# Patient Record
Sex: FEMALE | Race: BLACK OR AFRICAN AMERICAN | URBAN - METROPOLITAN AREA
[De-identification: names, ages, dates, MRNs, and addresses within clinical notes are randomized per-mention and may not be internally consistent; named-entity substitution may affect disease eponyms.]

---

## 2017-02-21 ENCOUNTER — TELEPHONE (OUTPATIENT)
Dept: INTERNAL MEDICINE CLINIC | Age: 82
End: 2017-02-21

## 2017-02-21 PROBLEM — Z63.8 STRESS DUE TO FAMILY TENSION: Status: ACTIVE | Noted: 2017-02-21

## 2017-02-21 PROBLEM — F32.A DEPRESSION: Status: ACTIVE | Noted: 2017-02-21

## 2017-02-21 PROBLEM — E03.9 ACQUIRED HYPOTHYROIDISM: Status: ACTIVE | Noted: 2017-02-21

## 2017-02-21 PROBLEM — G47.00 INSOMNIA: Status: ACTIVE | Noted: 2017-02-21

## 2017-02-21 PROBLEM — I10 ESSENTIAL HYPERTENSION: Status: ACTIVE | Noted: 2017-02-21

## 2017-02-21 PROBLEM — M19.90 ARTHRITIS: Status: ACTIVE | Noted: 2017-02-21

## 2017-02-24 ENCOUNTER — TELEPHONE (OUTPATIENT)
Dept: INTERNAL MEDICINE CLINIC | Age: 82
End: 2017-02-24

## 2017-02-24 NOTE — TELEPHONE ENCOUNTER
Pharmacist called related to a drug interaction to the medication Celebrex, Per Luma Griffiths she would like medication to be switched to Meloxicam 15mg, once daily

## 2017-03-01 ENCOUNTER — TELEPHONE (OUTPATIENT)
Dept: INTERNAL MEDICINE CLINIC | Age: 82
End: 2017-03-01

## 2017-03-01 NOTE — TELEPHONE ENCOUNTER
Second call was made to patient to inform of  recommendations.  Per  orders, patient is to stop taking Meloxicam, and start taking Tylenol arthritis OTC d/t side effetcts

## 2017-03-01 NOTE — TELEPHONE ENCOUNTER
Patient called and dicussed issues she was having r/t medication, pt stated her hands are swelling, and the medication is making her dizzy and she fell and hit her head on a sofa, patient was advised to visit ER to rule out any injures r/t her fall.  Patient was also told that I would talk to Stefany Harper regarding her medication, and give her a call back

## 2017-03-01 NOTE — TELEPHONE ENCOUNTER
Pt called stating she doesn't feel that she can take the Meloxicam. Pt states when she takes the med it makes her feel dizzy and she falls down. Pt is requesting a call back @ [phone].

## 2017-03-28 ENCOUNTER — OFFICE VISIT (OUTPATIENT)
Dept: INTERNAL MEDICINE CLINIC | Age: 82
End: 2017-03-28

## 2017-03-28 VITALS
TEMPERATURE: 98 F | BODY MASS INDEX: 25.69 KG/M2 | OXYGEN SATURATION: 96 % | HEIGHT: 63 IN | SYSTOLIC BLOOD PRESSURE: 174 MMHG | RESPIRATION RATE: 18 BRPM | DIASTOLIC BLOOD PRESSURE: 66 MMHG | HEART RATE: 55 BPM | WEIGHT: 145 LBS

## 2017-03-28 DIAGNOSIS — Z78.9 FULL CODE STATUS: ICD-10-CM

## 2017-03-28 DIAGNOSIS — Z13.39 SCREENING FOR ALCOHOLISM: ICD-10-CM

## 2017-03-28 DIAGNOSIS — M16.10 HIP ARTHRITIS: ICD-10-CM

## 2017-03-28 DIAGNOSIS — Z23 NEED FOR ZOSTAVAX ADMINISTRATION: ICD-10-CM

## 2017-03-28 DIAGNOSIS — Z71.89 ACP (ADVANCE CARE PLANNING): ICD-10-CM

## 2017-03-28 DIAGNOSIS — Z00.00 ROUTINE GENERAL MEDICAL EXAMINATION AT A HEALTH CARE FACILITY: Primary | ICD-10-CM

## 2017-03-28 DIAGNOSIS — Z23 NEED FOR STREPTOCOCCUS PNEUMONIAE VACCINATION: ICD-10-CM

## 2017-03-28 DIAGNOSIS — I10 ESSENTIAL HYPERTENSION: ICD-10-CM

## 2017-03-28 RX ORDER — ACETAMINOPHEN 500 MG
TABLET ORAL
COMMUNITY

## 2017-03-28 NOTE — PROGRESS NOTES
Mckenzie Deluna is a 80 y.o. female  Chief Complaint   Patient presents with    Arthritis:      follow-up    Sleep Problem     folloow-up pt states she has been sleeping better     1. Have you been to the ER, urgent care clinic since your last visit? Hospitalized since your last visit? Hospital r/t fall 3 weeks ago    2. Have you seen or consulted any other health care providers outside of the [address] [place] [address] since your last visit? Include any pap smears or colon screening. See above    Chief Complaint   Patient presents with    Arthritis     follow-up    Sleep Problem     folloow-up pt states she has been sleeping better     Pt reports prior hip injection with some relief from out of town orthopedist, none seen here. She is unable to take a bath because she cannot get out of the bath. This is a Subsequent Medicare Annual Wellness Visit providing Personalized Prevention Plan Services (PPPS) (Performed 12 months after initial AWV and PPPS )    I have reviewed the patient's medical history in detail and updated the computerized patient record. History     Past Medical History:   Diagnosis Date    Hypertension       Past Surgical History:   Procedure Laterality Date    HX HYSTERECTOMY       Current Outpatient Prescriptions   Medication Sig Dispense Refill    acetaminophen (TYLENOL EXTRA STRENGTH) 500 mg tablet Take  by mouth every six (6) hours as needed for Pain.  pneumococcal 13 sherry conj dip (PREVNAR-13) 0.5 mL syrg injection 0.5 mL by IntraMUSCular route once for 1 dose. 0.5 mL 0    raNITIdine (ZANTAC) 150 mg tablet Take 150 mg by mouth two (2) times a day.  Cholecalciferol, Vitamin D3, 50,000 unit cap Take 1 Cap by mouth daily.  DULoxetine (CYMBALTA) 20 mg capsule Take 20 mg by mouth two (2) times a day.  DULoxetine (CYMBALTA) 20 mg capsule Take 2 Caps by mouth nightly. 60 Cap 4    traZODone (DESYREL) 100 mg tablet Take 1 Tab by mouth nightly.  Indications: insomnia associated with depression 30 Tab 2    levothyroxine (SYNTHROID) 88 mcg tablet Take 1 Tab by mouth Daily (before breakfast). 90 Tab 4    hydroCHLOROthiazide (HYDRODIURIL) 25 mg tablet Take 1 Tab by mouth daily. 90 Tab 4    traMADol (ULTRAM) 50 mg tablet Take 50 mg by mouth every six (6) hours as needed for Pain.  celecoxib (CELEBREX) 200 mg capsule Take 1 Cap by mouth two (2) times a day for 90 days. 61 Cap 2     No Known Allergies  Family History   Problem Relation Age of Onset    Hypertension Mother     Hypertension Father      Social History   Substance Use Topics    Smoking status: Current Some Day Smoker     Packs/day: 0.25     Years: 3.00    Smokeless tobacco: Never Used    Alcohol use No     Patient Active Problem List   Diagnosis Code    Insomnia G47.00    Stress due to family tension Z63.8    Arthritis M19.90    Depression F32.9    Essential hypertension I10    Acquired hypothyroidism E03.9    ACP (advance care planning) Z71.89    Full code status Z78.9    Need for Zostavax administration Z23    Need for Streptococcus pneumoniae vaccination Z23       Depression Risk Factor Screening:     PHQ 2 / 9, over the last two weeks 2/21/2017   Little interest or pleasure in doing things Several days   Feeling down, depressed or hopeless Several days   Total Score PHQ 2 2     Alcohol Risk Factor Screening: On any occasion during the past 3 months, have you had more than 3 drinks containing alcohol? No    Do you average more than 7 drinks per week? No      Functional Ability and Level of Safety:     Hearing Loss   mild    Activities of Daily Living   Self-care. Requires assistance with: no ADLs    Fall Risk     Fall Risk Assessment, last 12 mths 3/28/2017   Able to walk? Yes   Fall in past 12 months? Yes   Fall with injury?  No   Number of falls in past 12 months 1   Fall Risk Score 1     Abuse Screen   Patient is not abused    Review of Systems     ROS: Negative except for BOLD  General: fever, chills, fatigue  Respiratory: cough, SOB, wheezing  Cardiovascular:  CP, palpitation, T[place], edema   Gastrointestinal: N/V/D, bleeding, constipation  Genito-Urinary: dysuria, hematuria  Musculoskeletal: muscle weakness, chronic pain, swelling      Physical Examination     Evaluation of Cognitive Function:  Mood/affect:  happy  Appearance: age appropriate  Family member/caregiver input: none present    Visit Vitals    /66 (BP 1 Location: Right arm, BP Patient Position: Sitting)    Pulse (!) 55    Temp 98 °F (36.7 °C) (Oral)    Resp 18    Ht 5' 3\" (1.6 m)    Wt 145 lb (65.8 kg)    SpO2 96%    BMI 25.69 kg/m2     General:  Alert, cooperative, no distress, appears stated age. Head:  Normocephalic, without obvious abnormality, atraumatic. Eyes:  Conjunctivae/corneas clear. PERRL, EOMs intact. Fundi benign. Ears:  Normal TMs and external ear canals both ears. Nose: Nares normal. Septum midline. Mucosa normal. No drainage or sinus tenderness. Throat: Lips, mucosa, and tongue normal. Teeth and gums normal.   Neck: Supple, symmetrical, trachea midline, no adenopathy, thyroid: no enlargement/tenderness/nodules, no carotid bruit and no JVD. Back:   Symmetric, no curvature. ROM normal. No CVA tenderness. Lungs:   Clear to auscultation bilaterally. Chest wall:  No tenderness or deformity. Heart:  Regular rate and rhythm, S1, S2 normal, no murmur, click, rub or gallop. Abdomen:   Soft, non-tender. Bowel sounds normal. No masses,  No organomegaly. Genitalia:  declined   Rectal:  declined   Extremities: Extremities normal, atraumatic, no cyanosis or edema, walks slowly with cane,    Pulses: 2+ and symmetric all extremities. Skin: Skin color, texture, turgor normal. No rashes or lesions. Lymph nodes: Cervical, supraclavicular, and axillary nodes normal.   Neurologic: CNII-XII intact. Normal strength, sensation and reflexes throughout.        Patient Care Team:  Melida Holbrook MD as PCP - General (Family Practice)    Advice/Referrals/Counseling   Education and counseling provided:  End-of-Life planning (with patient's consent)  Pneumococcal Vaccine  Influenza Vaccine  Hepatitis B Vaccine  Screening Mammography - pt declines further screening due to advanced age  Screening Pap and pelvic (covered once every 2 years)  Prostate cancer screening tests (PSA, covered annually)  Colorectal cancer screening tests- pt declines further screening due to advanced age  Cardiovascular screening blood test  Bone mass measurement (DEXA)- pt declines further screening due to advanced age  Screening for glaucoma  Diabetes screening test      Assessment/Plan       ICD-10-CM ICD-9-CM    1. Routine general medical examination at a health care facility Z00.00 V70.0 pneumococcal 13 sherry conj dip (PREVNAR-13) 0.5 mL syrg injection      REFERRAL TO OPHTHALMOLOGY      FULL CODE   2. Screening for alcoholism Z13.89 V79.1    3. Hip arthritis M19.90 716.95 Uncontrolled pain, eval by ortho needed,   Ref physical therapy for heated pool sheltering arms,   Ref   Sx improved after spiritual counseling. Regular stretching encouraged and demonstrated to patient  BP may be elevated secondary to pain- follow up in 1 month for BP recheck    4. ACP (advance care planning) Z71.89 V65.49    5. Full code status Z78.9 V49.89    6. Need for Streptococcus pneumoniae vaccination Z23 V03.82    7. Need for Zostavax administration Z23 V04.89    . Follow-up Disposition:  Return in about 6 months (around 9/28/2017) for Chronic Care - Nurse BP visit in 1 month.   Christiano Villagran MD

## 2017-03-28 NOTE — MR AVS SNAPSHOT
Visit Information Date & Time Provider Department Dept. Phone Encounter #  
 3/28/2017 10:30 AM Christiano Villagran MD [facility] Sports Medicine and [address] [place] [address] [phone] [id number] Follow-up Instructions Return in about 6 months (around 9/28/2017) for Chronic Care. Upcoming Health Maintenance Date Due ZOSTER VACCINE AGE 60> 7/11/1992 GLAUCOMA SCREENING Q2Y 7/11/1997 OSTEOPOROSIS SCREENING (DEXA) 7/11/1997 Pneumococcal 65+ Low/Medium Risk (1 of 2 - PCV13) 7/11/1997 MEDICARE YEARLY EXAM 7/11/1997 DTaP/Tdap/Td series (2 - Td) 3/28/2027 Allergies as of 3/28/2017  Review Complete On: 3/28/2017 By: Christiano Villagran MD  
 No Known Allergies Current Immunizations  Never Reviewed No immunizations on file. Not reviewed this visit You Were Diagnosed With   
  
 Codes Comments Hip arthritis    -  Primary ICD-10-CM: M19.90 ICD-9-CM: 716.95 Routine general medical examination at a health care facility     ICD-10-CM: Z00.00 ICD-9-CM: V70.0 Screening for alcoholism     ICD-10-CM: Z13.89 ICD-9-CM: V79.1 Vitals BP Pulse Temp Resp Height(growth percentile) Weight(growth percentile) 174/66 (BP 1 Location: Right arm, BP Patient Position: Sitting) (!) 55 98 °F (36.7 °C) (Oral) 18 5' 3\" (1.6 m) 145 lb (65.8 kg) SpO2 BMI OB Status Smoking Status 96% 25.69 kg/m2 Hysterectomy Current Some Day Smoker BMI and BSA Data Body Mass Index Body Surface Area  
 25.69 kg/m 2 1.71 m 2 Preferred Pharmacy Pharmacy Name Phone [facility] [phone] [facility] [phone] Your Updated Medication List  
  
   
This list is accurate as of: 3/28/17 10:56 AM.  Always use your most recent med list.  
  
  
  
  
 celecoxib 200 mg capsule Commonly known as:  CELEBREX Take 1 Cap by mouth two (2) times a day for 90 days. Cholecalciferol (Vitamin D3) 50,000 unit Cap Take 1 Cap by mouth daily. * DULoxetine 20 mg capsule Commonly known as:  CYMBALTA Take 20 mg by mouth two (2) times a day. * DULoxetine 20 mg capsule Commonly known as:  CYMBALTA Take 2 Caps by mouth nightly. hydroCHLOROthiazide 25 mg tablet Commonly known as:  HYDRODIURIL Take 1 Tab by mouth daily. levothyroxine 88 mcg tablet Commonly known as:  SYNTHROID Take 1 Tab by mouth Daily (before breakfast). raNITIdine 150 mg tablet Commonly known as:  ZANTAC Take 150 mg by mouth two (2) times a day. traMADol 50 mg tablet Commonly known as:  ULTRAM  
Take 50 mg by mouth every six (6) hours as needed for Pain. traZODone 100 mg tablet Commonly known as:  Bang Aleja Take 1 Tab by mouth nightly. Indications: insomnia associated with depression TYLENOL EXTRA STRENGTH 500 mg tablet Generic drug:  acetaminophen Take  by mouth every six (6) hours as needed for Pain. * Notice: This list has 2 medication(s) that are the same as other medications prescribed for you. Read the directions carefully, and ask your doctor or other care provider to review them with you. Follow-up Instructions Return in about 6 months (around 9/28/2017) for Chronic Care. Patient Instructions Hip Arthritis: Exercises Your Care Instructions Here are some examples of exercises for hip arthritis. Start each exercise slowly. Ease off the exercise if you start to have pain. Your doctor or physical therapist will tell you when you can start these exercises and which ones will work best for you. How to do the exercises Straight-leg raises to the outside 1. Lie on your side, with your affected hip on top. 2. Tighten the front thigh muscles of your top leg to keep your knee straight.  
3. Keep your hip and your leg straight in line with the rest of your body, and keep your knee pointing forward. Do not drop your hip back. 4. Lift your top leg straight up toward the ceiling, about 12 inches off the floor. Hold for about 6 seconds, then slowly lower your leg. 5. Repeat 8 to 12 times. 6. Switch legs and repeat steps 1 through 5, even if only one hip is sore. Straight-leg raises to the inside 1. Lie on your side with your affected hip on the floor. 2. You can either prop up your other leg on a chair, or you can bend that knee and put that foot in front of your other knee. Do not drop your hip back. 3. Tighten the muscles on the front thigh of your bottom leg to straighten that knee. 4. Keep your kneecap pointing forward and your leg straight, and lift your bottom leg up toward the ceiling about 6 inches. Hold for about 6 seconds, then lower slowly. 5. Repeat 8 to 12 times. 6. Switch legs and repeat steps 1 through 5, even if only one hip is sore. Hip hike 1. Stand sideways on the bottom step of a staircase, and hold on to the banister or wall. 2. Keeping both knees straight, lift your good leg off the step and let it hang down. Then hike your good hip up to the same level as your affected hip or a little higher. 3. Repeat 8 to 12 times. 4. Switch legs and repeat steps 1 through 3, even if only one hip is sore. Bridging 1. Lie on your back with both knees bent. Your knees should be bent about 90 degrees. 2. Then push your feet into the floor, squeeze your buttocks, and lift your hips off the floor until your shoulders, hips, and knees are all in a straight line. 3. Hold for about 6 seconds as you continue to breathe normally, and then slowly lower your hips back down to the floor and rest for up to 10 seconds. 4. Repeat 8 to 12 times. Hamstring stretch (lying down) 1. Lie flat on your back with your legs straight. If you feel discomfort in your back, place a small towel roll under your lower back. 2. Holding the back of your affected leg, lift your leg straight up and toward your body until you feel a stretch at the back of your thigh. 3. Hold the stretch for at least 30 seconds. 4. Repeat 2 to 4 times. 5. Switch legs and repeat steps 1 through 4, even if only one hip is sore. Standing quadriceps stretch 1. If you are not steady on your feet, hold on to a chair, counter, or wall. You can also lie on your stomach or your side to do this exercise. 2. Bend the knee of the leg you want to stretch, and reach behind you to grab the front of your foot or ankle with the hand on the same side. For example, if you are stretching your right leg, use your right hand. 3. Keeping your knees next to each other, pull your foot toward your buttock until you feel a gentle stretch across the front of your hip and down the front of your thigh. Your knee should be pointed directly to the ground, and not out to the side. 4. Hold the stretch for at least 15 to 30 seconds. 5. Repeat 2 to 4 times. 6. Switch legs and repeat steps 1 through 5, even if only one hip is sore. Hip rotator stretch 1. Lie on your back with both knees bent and your feet flat on the floor. 2. Put the ankle of your affected leg on your opposite thigh near your knee. 3. Use your hand to gently push your knee away from your body until you feel a gentle stretch around your hip. 4. Hold the stretch for 15 to 30 seconds. 5. Repeat 2 to 4 times. 6. Repeat steps 1 through 5, but this time use your hand to gently pull your knee toward your opposite shoulder. 7. Switch legs and repeat steps 1 through 6, even if only one hip is sore. Knee-to-chest 
 
1. Lie on your back with your knees bent and your feet flat on the floor. 2. Bring your affected leg to your chest, keeping the other foot flat on the floor (or keeping the other leg straight, whichever feels better on your lower back). 3. Keep your lower back pressed to the floor. Hold for at least 15 to 30 seconds. 4. Relax, and lower the knee to the starting position. 5. Repeat 2 to 4 times. 6. Switch legs and repeat steps 1 through 5, even if only one hip is sore. 7. To get more stretch, put your other leg flat on the floor while pulling your knee to your chest. 
Clamshell 1. Lie on your side, with your affected hip on top. Keep your feet and knees together and your knees bent. 2. Raise your top knee, but keep your feet together. Do not let your hips roll back. Your legs should open up like a clamshell. 3. Hold for 6 seconds. 4. Slowly lower your knee back down. Rest for 10 seconds. 5. Repeat 8 to 12 times. 6. Switch legs and repeat steps 1 through 5, even if only one hip is sore. Follow-up care is a key part of your treatment and safety. Be sure to make and go to all appointments, and call your doctor if you are having problems. It's also a good idea to know your test results and keep a list of the medicines you take. Where can you learn more? Go to http://coco-yulia.info/. Enter Y577 in the search box to learn more about \"Hip Arthritis: Exercises. \" Current as of: May 23, 2016 Content Version: 11.1 © 7523-6155 [employer]wise, Incorporated. Care instructions adapted under license by [employer] (which disclaims liability or warranty for this information). If you have questions about a medical condition or this instruction, always ask your healthcare professional. [place] [address] any warranty or liability for your use of this information. Hip Bursitis: Exercises Your Care Instructions Here are some examples of typical rehabilitation exercises for your condition. Start each exercise slowly. Ease off the exercise if you start to have pain. Your doctor or physical therapist will tell you when you can start these exercises and which ones will work best for you. How to do the exercises Hip rotator stretch 7. Lie on your back with both knees bent and your feet flat on the floor. 8. Put the ankle of your affected leg on your opposite thigh near your knee. 9. Use your hand to gently push your knee away from your body until you feel a gentle stretch around your hip. 10. Hold the stretch for 15 to 30 seconds. 11. Repeat 2 to 4 times. 12. Repeat steps 1 through 5, but this time use your hand to gently pull your knee toward your opposite shoulder. Iliotibial band stretch 7. Lean sideways against a wall. If you are not steady on your feet, hold on to a chair or counter. 8. Stand on the leg with the affected hip, with that leg close to the wall. Then cross your other leg in front of it. 9. Let your affected hip drop out to the side of your body and against wall. Then lean away from your affected hip until you feel a stretch. 10. Hold the stretch for 15 to 30 seconds. 11. Repeat 2 to 4 times. Straight-leg raises to the outside 5. Lie on your side, with your affected hip on top. 6. Tighten the front thigh muscles of your top leg to keep your knee straight. 7. Keep your hip and your leg straight in line with the rest of your body, and keep your knee pointing forward. Do not drop your hip back. 8. Lift your top leg straight up toward the ceiling, about 12 inches off the floor. Hold for about 6 seconds, then slowly lower your leg. 9. Repeat 8 to 12 times. Clamshell 5. Lie on your side, with your affected hip on top and your head propped on a pillow. Keep your feet and knees together and your knees bent. 6. Raise your top knee, but keep your feet together. Do not let your hips roll back. Your legs should open up like a clamshell. 7. Hold for 6 seconds. 8. Slowly lower your knee back down. Rest for 10 seconds. 9. Repeat 8 to 12 times. Follow-up care is a key part of your treatment and safety.  Be sure to make and go to all appointments, and call your doctor if you are having problems. It's also a good idea to know your test results and keep a list of the medicines you take. Where can you learn more? Go to http://coco-yulia.info/. Enter D677 in the search box to learn more about \"Hip Bursitis: Exercises. \" Current as of: May 23, 2016 Content Version: 11.1 © 9103-2669 [employer]. Care instructions adapted under license by [employer] (which disclaims liability or warranty for this information). If you have questions about a medical condition or this instruction, always ask your healthcare professional. [address] any warranty or liability for your use of this information. Stretching: Exercises Your Care Instructions Here are some examples of exercises for stretching. Start each exercise slowly. Ease off the exercise if you start to have pain. Your doctor or physical therapist will tell you when you can start these exercises and which ones will work best for you. How to do the exercises Latissimus stretch 13. Stand with your back straight and your feet shoulder-width apart. You can do this stretch sitting down if you are not steady on your feet. 14. Hold your arms above your head, and hold one hand with the other. 15. Pull upward while leaning straight over toward your right side. Keep your lower body straight. You should feel the stretch along your left side. 16. Hold 15 to 30 seconds, and then switch sides. 17. Repeat 2 to 4 times for each side. Triceps stretch 12. Stand with your back straight and your feet shoulder-width apart. You can do this stretch sitting down if you are not steady on your feet. 13. Bring your left elbow straight up while bending your arm. 15. Grab your left elbow with your right hand, and pull your left elbow toward your head with light pressure.  If you are more flexible, you may pull your arm slightly behind your head. You will feel the stretch along the back of your arm. 15. Hold 15 to 30 seconds, and then switch elbows. 16. Repeat 2 to 4 times for each arm. Calf stretch 10. Place your hands on a wall for balance. You can also do this with your hands on the back of a chair, a countertop, or a tree. 11. Step back with your left leg. Keep the leg straight, and press your left heel into the floor. 12. Press your hips forward, bending your right leg slightly. You will feel the stretch in your left calf. 13. Hold the stretch 15 to 30 seconds. 14. Repeat 2 to 4 times for each leg. Quadriceps stretch 10. Lie on your side with one hand supporting your head. [address] your upper leg back and grab your ankle with your other hand. 12. Stretch your leg back by pulling your foot toward your buttocks. You will feel the stretch in the front of your thigh. If this causes stress on your knees, do not do this stretch. 13. Hold the stretch 15 to 30 seconds. 14. Repeat 2 to 4 times for each leg. Groin stretch 6. Sit on the floor and put the soles of your feet together. Do not slump your back. 7. Grab your ankles and gently pull your legs toward you. 8. Press your knees toward the floor. You will feel the stretch in your inner thighs. 9. Hold 15 to 30 seconds. 10. Repeat 2 to 4 times. Hamstring stretch in doorway 7. Lie on the floor near a doorway, with your buttocks close to the wall. 8. Let the leg you are not stretching extend through the doorway. 9. Put the leg you want to stretch up on the wall, and straighten your knee to feel a gentle stretch at the back of your leg. 10. Hold the stretch for at least 15 to 30 seconds. Repeat 2 to 4 times. Follow-up care is a key part of your treatment and safety. Be sure to make and go to all appointments, and call your doctor if you are having problems.  It's also a good idea to know your test results and keep a list of the medicines you take. Where can you learn more? Go to http://coco-yulia.info/. Enter 780 0850 in the search box to learn more about \"Stretching: Exercises. \" Current as of: May 27, 2016 Content Version: 11.1 © 7664-1701 [employer], Incorporated. Care instructions adapted under license by "[employer] (which disclaims liability or warranty for this information). If you have questions about a medical condition or this instruction, always ask your healthcare professional. [address] any warranty or liability for your use of this information. Introducing hospitals & HEALTH SERVICES! [facility] introduces Taste Kitchen patient portal. Now you can access parts of your medical record, email your doctor's office, and request medication refills online. 1. In your internet browser, go to [email or web address] 2. Click on the First Time User? Click Here link in the Sign In box. You will see the New Member Sign Up page. 3. Enter your Taste Kitchen Access Code exactly as it appears below. You will not need to use this code after youve completed the sign-up process. If you do not sign up before the expiration date, you must request a new code. · T[employer] Kitchen Access Code: [email or web address] Expires: 5/22/2017  3:56 PM 
 
4. Enter the last four digits of your Social Security Number (xxxx) and Date of Birth (mm/dd/yyyy) as indicated and click Submit. You will be taken to the next sign-up page. 5. Create a Taste Kitchen ID. This will be your T[employer] login ID and cannot be changed, so think of one that is secure and easy to remember. 6. Create a T[employer] Kitchen password. You can change your password at any time. 7. Enter your Password Reset Question and Answer. This can be used at a later time if you forget your password. 8. Enter your e-mail address. You will receive e-mail notification when new information is available in [address] 9. Click Sign Up. You can now view and download portions of your medical record. 10. Click the Download Summary menu link to download a portable copy of your medical information. If you have questions, please visit the Frequently Asked Questions section of the [employer] website. Remember, Samanage is NOT to be used for urgent needs. For medical emergencies, dial 911. Now available from your iPhone and Android! Please provide this summary of care documentation to your next provider. Your primary care clinician is listed as Travis Berger. If you have any questions after today's visit, please call [phone].

## 2017-03-28 NOTE — PATIENT INSTRUCTIONS
Hip Arthritis: Exercises  Your Care Instructions  Here are some examples of exercises for hip arthritis. Start each exercise slowly. Ease off the exercise if you start to have pain. Your doctor or physical therapist will tell you when you can start these exercises and which ones will work best for you. How to do the exercises  Straight-leg raises to the outside    1. Lie on your side, with your affected hip on top. 2. Tighten the front thigh muscles of your top leg to keep your knee straight. 3. Keep your hip and your leg straight in line with the rest of your body, and keep your knee pointing forward. Do not drop your hip back. 4. Lift your top leg straight up toward the ceiling, about 12 inches off the floor. Hold for about 6 seconds, then slowly lower your leg. 5. Repeat 8 to 12 times. 6. Switch legs and repeat steps 1 through 5, even if only one hip is sore. Straight-leg raises to the inside    1. Lie on your side with your affected hip on the floor. 2. You can either prop up your other leg on a chair, or you can bend that knee and put that foot in front of your other knee. Do not drop your hip back. 3. Tighten the muscles on the front thigh of your bottom leg to straighten that knee. 4. Keep your kneecap pointing forward and your leg straight, and lift your bottom leg up toward the ceiling about 6 inches. Hold for about 6 seconds, then lower slowly. 5. Repeat 8 to 12 times. 6. Switch legs and repeat steps 1 through 5, even if only one hip is sore. Hip hike    1. Stand sideways on the bottom step of a staircase, and hold on to the banister or wall. 2. Keeping both knees straight, lift your good leg off the step and let it hang down. Then hike your good hip up to the same level as your affected hip or a little higher. 3. Repeat 8 to 12 times. 4. Switch legs and repeat steps 1 through 3, even if only one hip is sore. Bridging    1. Lie on your back with both knees bent.  Your knees should be bent about 90 degrees. 2. Then push your feet into the floor, squeeze your buttocks, and lift your hips off the floor until your shoulders, hips, and knees are all in a straight line. 3. Hold for about 6 seconds as you continue to breathe normally, and then slowly lower your hips back down to the floor and rest for up to 10 seconds. 4. Repeat 8 to 12 times. Hamstring stretch (lying down)    1. Lie flat on your back with your legs straight. If you feel discomfort in your back, place a small towel roll under your lower back. 2. Holding the back of your affected leg, lift your leg straight up and toward your body until you feel a stretch at the back of your thigh. 3. Hold the stretch for at least 30 seconds. 4. Repeat 2 to 4 times. 5. Switch legs and repeat steps 1 through 4, even if only one hip is sore. Standing quadriceps stretch    1. If you are not steady on your feet, hold on to a chair, counter, or wall. You can also lie on your stomach or your side to do this exercise. 2. Bend the knee of the leg you want to stretch, and reach behind you to grab the front of your foot or ankle with the hand on the same side. For example, if you are stretching your right leg, use your right hand. 3. Keeping your knees next to each other, pull your foot toward your buttock until you feel a gentle stretch across the front of your hip and down the front of your thigh. Your knee should be pointed directly to the ground, and not out to the side. 4. Hold the stretch for at least 15 to 30 seconds. 5. Repeat 2 to 4 times. 6. Switch legs and repeat steps 1 through 5, even if only one hip is sore. Hip rotator stretch    1. Lie on your back with both knees bent and your feet flat on the floor. 2. Put the ankle of your affected leg on your opposite thigh near your knee. 3. Use your hand to gently push your knee away from your body until you feel a gentle stretch around your hip.   4. Hold the stretch for 15 to 30 seconds. 5. Repeat 2 to 4 times. 6. Repeat steps 1 through 5, but this time use your hand to gently pull your knee toward your opposite shoulder. 7. Switch legs and repeat steps 1 through 6, even if only one hip is sore. Knee-to-chest    1. Lie on your back with your knees bent and your feet flat on the floor. 2. Bring your affected leg to your chest, keeping the other foot flat on the floor (or keeping the other leg straight, whichever feels better on your lower back). 3. Keep your lower back pressed to the floor. Hold for at least 15 to 30 seconds. 4. Relax, and lower the knee to the starting position. 5. Repeat 2 to 4 times. 6. Switch legs and repeat steps 1 through 5, even if only one hip is sore. 7. To get more stretch, put your other leg flat on the floor while pulling your knee to your chest.  Clamshell    1. Lie on your side, with your affected hip on top. Keep your feet and knees together and your knees bent. 2. Raise your top knee, but keep your feet together. Do not let your hips roll back. Your legs should open up like a clamshell. 3. Hold for 6 seconds. 4. Slowly lower your knee back down. Rest for 10 seconds. 5. Repeat 8 to 12 times. 6. Switch legs and repeat steps 1 through 5, even if only one hip is sore. Follow-up care is a key part of your treatment and safety. Be sure to make and go to all appointments, and call your doctor if you are having problems. It's also a good idea to know your test results and keep a list of the medicines you take. Where can you learn more? Go to http://coco-yulia.info/. Enter S036 in the search box to learn more about \"Hip Arthritis: Exercises. \"  Current as of: May 23, 2016  Content Version: 11.1  © 5444-3298 [employer]. Care instructions adapted under license by [employer] (which disclaims liability or warranty for this information).  If you have questions about a medical condition or this instruction, always ask your healthcare professional. [place] [address] any warranty or liability for your use of this information. Hip Bursitis: Exercises  Your Care Instructions  Here are some examples of typical rehabilitation exercises for your condition. Start each exercise slowly. Ease off the exercise if you start to have pain. Your doctor or physical therapist will tell you when you can start these exercises and which ones will work best for you. How to do the exercises  Hip rotator stretch    7. Lie on your back with both knees bent and your feet flat on the floor. 8. Put the ankle of your affected leg on your opposite thigh near your knee. 9. Use your hand to gently push your knee away from your body until you feel a gentle stretch around your hip. 10. Hold the stretch for 15 to 30 seconds. 11. Repeat 2 to 4 times. 12. Repeat steps 1 through 5, but this time use your hand to gently pull your knee toward your opposite shoulder. Iliotibial band stretch    7. Lean sideways against a wall. If you are not steady on your feet, hold on to a chair or counter. 8. Stand on the leg with the affected hip, with that leg close to the wall. Then cross your other leg in front of it. 9. Let your affected hip drop out to the side of your body and against wall. Then lean away from your affected hip until you feel a stretch. 10. Hold the stretch for 15 to 30 seconds. 11. Repeat 2 to 4 times. Straight-leg raises to the outside    5. Lie on your side, with your affected hip on top. 6. Tighten the front thigh muscles of your top leg to keep your knee straight. 7. Keep your hip and your leg straight in line with the rest of your body, and keep your knee pointing forward. Do not drop your hip back. 8. Lift your top leg straight up toward the ceiling, about 12 inches off the floor. Hold for about 6 seconds, then slowly lower your leg. 9. Repeat 8 to 12 times. Clamshell    5.  Lie on your side, with your affected hip on top and your head propped on a pillow. Keep your feet and knees together and your knees bent. 6. Raise your top knee, but keep your feet together. Do not let your hips roll back. Your legs should open up like a clamshell. 7. Hold for 6 seconds. 8. Slowly lower your knee back down. Rest for 10 seconds. 9. Repeat 8 to 12 times. Follow-up care is a key part of your treatment and safety. Be sure to make and go to all appointments, and call your doctor if you are having problems. It's also a good idea to know your test results and keep a list of the medicines you take. Where can you learn more? Go to http://coco-yulia.info/. Enter O287 in the search box to learn more about \"Hip Bursitis: Exercises. \"  Current as of: May 23, 2016  Content Version: 11.1  © 20068888-9552 [employer]. Care instructions adapted under license by [employer] (which disclaims liability or warranty for this information). If you have questions about a medical condition or this instruction, always ask your healthcare professional. [place] [address] any warranty or liability for your use of this information. Stretching: Exercises  Your Care Instructions  Here are some examples of exercises for stretching. Start each exercise slowly. Ease off the exercise if you start to have pain. Your doctor or physical therapist will tell you when you can start these exercises and which ones will work best for you. How to do the exercises  Latissimus stretch    13. Stand with your back straight and your feet shoulder-width apart. You can do this stretch sitting down if you are not steady on your feet. 14. Hold your arms above your head, and hold one hand with the other. 15. Pull upward while leaning straight over toward your right side. Keep your lower body straight. You should feel the stretch along your left side.   16. Hold 15 to 30 seconds, and then switch sides.  17. Repeat 2 to 4 times for each side. Triceps stretch    12. Stand with your back straight and your feet shoulder-width apart. You can do this stretch sitting down if you are not steady on your feet. 13. Bring your left elbow straight up while bending your arm. 15. Grab your left elbow with your right hand, and pull your left elbow toward your head with light pressure. If you are more flexible, you may pull your arm slightly behind your head. You will feel the stretch along the back of your arm. 15. Hold 15 to 30 seconds, and then switch elbows. 16. Repeat 2 to 4 times for each arm. Calf stretch    10. Place your hands on a wall for balance. You can also do this with your hands on the back of a chair, a countertop, or a tree. 11. Step back with your left leg. Keep the leg straight, and press your left heel into the floor. 12. Press your hips forward, bending your right leg slightly. You will feel the stretch in your left calf. 13. Hold the stretch 15 to 30 seconds. 14. Repeat 2 to 4 times for each leg. Quadriceps stretch    10. Lie on your side with one hand supporting your head. [address] your upper leg back and grab your ankle with your other hand. 12. Stretch your leg back by pulling your foot toward your buttocks. You will feel the stretch in the front of your thigh. If this causes stress on your knees, do not do this stretch. 13. Hold the stretch 15 to 30 seconds. 14. Repeat 2 to 4 times for each leg. Groin stretch    6. Sit on the floor and put the soles of your feet together. Do not slump your back. 7. Grab your ankles and gently pull your legs toward you. 8. Press your knees toward the floor. You will feel the stretch in your inner thighs. 9. Hold 15 to 30 seconds. 10. Repeat 2 to 4 times. Hamstring stretch in doorway    7. Lie on the floor near a doorway, with your buttocks close to the wall. 8. Let the leg you are not stretching extend through the doorway.   9. Put the leg you want to stretch up on the wall, and straighten your knee to feel a gentle stretch at the back of your leg. 10. Hold the stretch for at least 15 to 30 seconds. Repeat 2 to 4 times. Follow-up care is a key part of your treatment and safety. Be sure to make and go to all appointments, and call your doctor if you are having problems. It's also a good idea to know your test results and keep a list of the medicines you take. Where can you learn more? Go to http://coco-yulia.info/. Enter 780 [id number] in the search box to learn more about \"Stretching: Exercises. \"  Current as of: May 27, 2016  Content Version: 11.1  © 5889-1802 [employer]. Care instructions adapted under license by [employer] (which disclaims liability or warranty for this information). If you have questions about a medical condition or this instruction, always ask your healthcare professional. [address] any warranty or liability for your use of this information.

## 2017-12-07 ENCOUNTER — OFFICE VISIT (OUTPATIENT)
Dept: INTERNAL MEDICINE CLINIC | Age: 82
End: 2017-12-07

## 2017-12-07 VITALS
TEMPERATURE: 97.7 F | SYSTOLIC BLOOD PRESSURE: 140 MMHG | BODY MASS INDEX: 23.64 KG/M2 | RESPIRATION RATE: 16 BRPM | DIASTOLIC BLOOD PRESSURE: 81 MMHG | WEIGHT: 133.4 LBS | HEIGHT: 63 IN | OXYGEN SATURATION: 98 % | HEART RATE: 74 BPM

## 2017-12-07 DIAGNOSIS — R73.09 ELEVATED GLUCOSE: ICD-10-CM

## 2017-12-07 DIAGNOSIS — I10 ESSENTIAL HYPERTENSION: ICD-10-CM

## 2017-12-07 DIAGNOSIS — E03.9 ACQUIRED HYPOTHYROIDISM: ICD-10-CM

## 2017-12-07 DIAGNOSIS — F32.A DEPRESSION, UNSPECIFIED DEPRESSION TYPE: ICD-10-CM

## 2017-12-07 DIAGNOSIS — Z13.820 SCREENING FOR OSTEOPOROSIS: ICD-10-CM

## 2017-12-07 DIAGNOSIS — G47.00 INSOMNIA, UNSPECIFIED TYPE: Primary | ICD-10-CM

## 2017-12-07 RX ORDER — HYDROCHLOROTHIAZIDE 25 MG/1
25 TABLET ORAL DAILY
Qty: 90 TAB | Refills: 4 | Status: SHIPPED | OUTPATIENT
Start: 2017-12-07 | End: 2019-03-29 | Stop reason: SDUPTHER

## 2017-12-07 RX ORDER — DULOXETIN HYDROCHLORIDE 20 MG/1
40 CAPSULE, DELAYED RELEASE ORAL
Qty: 60 CAP | Refills: 4 | Status: SHIPPED | OUTPATIENT
Start: 2017-12-07 | End: 2019-03-29 | Stop reason: SDUPTHER

## 2017-12-07 NOTE — MR AVS SNAPSHOT
Visit Information Date & Time Provider Department Dept. Phone Encounter #  
 12/7/2017 11:00 AM Ebonie Madison MD [facility] and [address] [place] [address] [phone] [id number] Follow-up Instructions Return in about 4 months (around 4/3/2018) for medicare wellness. Follow-up and Disposition History Upcoming Health Maintenance Date Due  
 GLAUCOMA SCREENING Q2Y 7/11/1997 OSTEOPOROSIS SCREENING (DEXA) 7/11/1997 MEDICARE YEARLY EXAM 3/29/2018 Pneumococcal 65+ Low/Medium Risk (2 of 2 - PPSV23) 12/7/2018 DTaP/Tdap/Td series (2 - Td) 3/28/2027 Allergies as of 12/7/2017  Review Complete On: 12/7/2017 By: Andrew Ann No Known Allergies Current Immunizations  Never Reviewed No immunizations on file. Not reviewed this visit You Were Diagnosed With   
  
 Codes Comments Insomnia, unspecified type    -  Primary ICD-10-CM: G47.00 ICD-9-CM: 780.52 Depression, unspecified depression type     ICD-10-CM: F32.9 ICD-9-CM: 646 Essential hypertension     ICD-10-CM: I10 
ICD-9-CM: 401.9 Acquired hypothyroidism     ICD-10-CM: E03.9 ICD-9-CM: 244.9 Elevated glucose     ICD-10-CM: R73.09 
ICD-9-CM: 790.29 Screening for osteoporosis     ICD-10-CM: Z13.820 ICD-9-CM: V82.81 Vitals BP Pulse Temp Resp Height(growth percentile) Weight(growth percentile) 140/81 (BP 1 Location: Left arm, BP Patient Position: Sitting) 74 97.7 °F (36.5 °C) (Oral) 16 5' 3\" (1.6 m) 133 lb 6.4 oz (60.5 kg) SpO2 BMI OB Status Smoking Status 98% 23.63 kg/m2 Hysterectomy Current Some Day Smoker Vitals History BMI and BSA Data Body Mass Index Body Surface Area  
 23.63 kg/m 2 1.64 m 2 Preferred Pharmacy Pharmacy Name Phone [facility] [address] [facility] [phone] Your Updated Medication List  
  
   
 This list is accurate as of: 12/7/17 11:58 AM.  Always use your most recent med list.  
  
  
  
  
 Cholecalciferol (Vitamin D3) 50,000 unit Cap Take 1 Cap by mouth daily. DULoxetine 20 mg capsule Commonly known as:  CYMBALTA Take 2 Caps by mouth nightly. hydroCHLOROthiazide 25 mg tablet Commonly known as:  HYDRODIURIL Take 1 Tab by mouth daily. levothyroxine 88 mcg tablet Commonly known as:  SYNTHROID Take 1 Tab by mouth Daily (before breakfast). raNITIdine 150 mg tablet Commonly known as:  ZANTAC Take 150 mg by mouth two (2) times a day. traMADol 50 mg tablet Commonly known as:  ULTRAM  
Take 50 mg by mouth every six (6) hours as needed for Pain. traZODone 100 mg tablet Commonly known as:  DESYREL  
TAKE ONE TABLET BY MOUTH ONCE NIGHTLY  
  
 TYLENOL EXTRA STRENGTH 500 mg tablet Generic drug:  acetaminophen Take  by mouth every six (6) hours as needed for Pain. Prescriptions Sent to Pharmacy Refills  
 hydroCHLOROthiazide (HYDRODIURIL) 25 mg tablet 4 Sig: Take 1 Tab by mouth daily. Class: Normal  
 Pharmacy: [address] Ph #: [phone] Route: Oral  
 DULoxetine (CYMBALTA) 20 mg capsule 4 Sig: Take 2 Caps by mouth nightly. Class: Normal  
 Pharmacy: [address] Ph #: [phone] Route: Oral  
  
We Performed the Following HEMOGLOBIN A1C WITH EAG [82058 CPT(R)] METABOLIC PANEL, BASIC [05031 CPT(R)] REFERRAL TO OPHTHALMOLOGY [REF57 Custom] TSH REFLEX TO T4 [[id number] Custom] Comments:  
 Please add T3 if TSH is abnormal  
  
Follow-up Instructions Return in about 4 months (around 4/3/2018) for medicare wellness. To-Do List   
 12/07/2017 Imaging:  DEXA BONE DENSITY STUDY AXIAL Referral Information Referral ID Referred By Referred To  
  
 [id number] [place] [facility] [address] [place], [address] Visits Status Start Date End Date 1 New Request 12/7/17 12/7/18 If your referral has a status of pending review or denied, additional information will be sent to support the outcome of this decision. Introducing [facility] SERVICES! Michael Mejia introduces Peak 10 patient portal. Now you can access parts of your medical record, email your doctor's office, and request medication refills online. 1. In your internet browser, go to https://[employer]. [employer] 2. Click on the First Time User? Click Here link in the Sign In box. You will see the New Member Sign Up page. 3. Enter your Peak 10 Access Code exactly as it appears below. You will not need to use this code after youve completed the sign-up process. If you do not sign up before the expiration date, you must request a new code. · Peak 10 Access Code: [email or web address] Expires: 3/7/2018 11:20 AM 
 
4. Enter the last four digits of your Social Security Number (xxxx) and Date of Birth (mm/dd/yyyy) as indicated and click Submit. You will be taken to the next sign-up page. 5. Create a Peak 10 ID. This will be your Peak 10 login ID and cannot be changed, so think of one that is secure and easy to remember. 6. Create a Peak 10 password. You can change your password at any time. 7. Enter your Password Reset Question and Answer. This can be used at a later time if you forget your password. 8. Enter your e-mail address. You will receive e-mail notification when new information is available in [address] 9. Click Sign Up. You can now view and download portions of your medical record. 10. Click the Download Summary menu link to download a portable copy of your medical information.  
 
If you have questions, please visit the Frequently Asked Questions section of the C2C Link. Remember, [employer] is NOT to be used for urgent needs. For medical emergencies, dial 911. Now available from your iPhone and Android! Please provide this summary of care documentation to your next provider. Your primary care clinician is listed as Martín Hale. If you have any questions after today's visit, please call [phone].

## 2017-12-07 NOTE — PROGRESS NOTES
Chief Complaint   Patient presents with    Medication Refill     rm 4 patient here to follow up on medications with Dr. Mahesh Hull Insomnia     patient states it is doing better      1. Have you been to the ER, urgent care clinic since your last visit? Hospitalized since your last visit? No    2. Have you seen or consulted any other health care providers outside of the [address] [place] [address] since your last visit? Include any pap smears or colon screening.  No

## 2017-12-07 NOTE — PROGRESS NOTES
Ms. Arliss Alpers is a 80y.o. year old female who had concerns including Medication Refill and Insomnia. HPI:  Chief Complaint   Patient presents with    Medication Refill     rm 4 patient here to follow up on medications with Dr. Khoi Hernandez Insomnia     patient states it is doing better      Hx of depression. Controlled on cymbalta. Hip OA improved with home exercises and tylenol extra strength. HTN and hypothyroidism- well controlled. Tolerates medication. Screening test due still. Past Medical History:   Diagnosis Date    Hypertension      Current Outpatient Prescriptions   Medication Sig Dispense    hydroCHLOROthiazide (HYDRODIURIL) 25 mg tablet Take 1 Tab by mouth daily. 90 Tab    DULoxetine (CYMBALTA) 20 mg capsule Take 2 Caps by mouth nightly. 60 Cap    traZODone (DESYREL) 100 mg tablet TAKE ONE TABLET BY MOUTH ONCE NIGHTLY 30 Tab    acetaminophen (TYLENOL EXTRA STRENGTH) 500 mg tablet Take  by mouth every six (6) hours as needed for Pain.  traMADol (ULTRAM) 50 mg tablet Take 50 mg by mouth every six (6) hours as needed for Pain.  raNITIdine (ZANTAC) 150 mg tablet Take 150 mg by mouth two (2) times a day.  Cholecalciferol, Vitamin D3, 50,000 unit cap Take 1 Cap by mouth daily.  levothyroxine (SYNTHROID) 88 mcg tablet Take 1 Tab by mouth Daily (before breakfast). 90 Tab     No current facility-administered medications for this visit. Reviewed PmHx, RxHx, FmHx, SocHx, AllgHx and updated and dated in the chart.     ROS: Negative except for BOLD  General: fever, chills, fatigue  Respiratory: cough, SOB, wheezing  Cardiovascular:  CP, palpitation, TOLEDO, edema   Gastrointestinal: N/V/D, bleeding  Genito-Urinary: dysuria, hematuria  Musculoskeletal: muscle weakness, pain, swelling    OBJECTIVE:   Visit Vitals    /81 (BP 1 Location: Left arm, BP Patient Position: Sitting)    Pulse 74    Temp 97.7 °F (36.5 °C) (Oral)    Resp 16    Ht 5' 3\" (1.6 m)    Wt 133 lb 6.4 oz (60.5 kg)    SpO2 98%    BMI 23.63 kg/m2     GEN: The patient appears well, alert, oriented x 3, in no distress. ENT: bilateral TM and canal normal.  Neck supple. No adenopathy or thyromegaly. KIRA. Lungs: clear bilaterally, good air entry, no wheezes, rhonchi or rales. Cardiovascular: regular rate and rhythm. S1 and S2 normal, no murmurs,  Abdomen: + BS, soft without tenderness, guarding, rebound, mass or organomegaly. Extremities: no edema, normal peripheral pulses. Neurological: normal, gross sensory and motor in tact without focal findings. Results for orders placed or performed in visit on 82/46/46   METABOLIC PANEL, BASIC   Result Value Ref Range    Glucose 160 (H) 65 - 99 mg/dL    BUN 17 8 - 27 mg/dL    Creatinine 0.84 0.57 - 1.00 mg/dL    GFR est non-AA 64 >59 mL/min/1.73    GFR est AA 74 >59 mL/min/1.73    BUN/Creatinine ratio 20 11 - 26    Sodium 142 134 - 144 mmol/L    Potassium 4.5 3.5 - 5.2 mmol/L    Chloride 99 96 - 106 mmol/L    CO2 21 18 - 29 mmol/L    Calcium 11.0 (H) 8.7 - 10.3 mg/dL   TSH REFLEX TO T4   Result Value Ref Range    TSH 1.010 0.450 - 4.500 uIU/mL         Assessment/ Plan:       ICD-10-CM ICD-9-CM    1. Insomnia, unspecified type G47.00 780.52 DULoxetine (CYMBALTA) 20 mg capsule   2. Depression, unspecified depression type F32.9 311 DULoxetine (CYMBALTA) 20 mg capsule      TSH REFLEX TO T4   3. Essential hypertension I10 401.9 hydroCHLOROthiazide (HYDRODIURIL) 25 mg tablet   4. Acquired hypothyroidism E03.9 244.9 TSH REFLEX TO T4   5. Elevated glucose R73.09 790.29 REFERRAL TO OPHTHALMOLOGY      HEMOGLOBIN A1C WITH EAG   6. Screening for osteoporosis Z13.820 V82.81 DEXA BONE DENSITY STUDY AXIAL      METABOLIC PANEL, BASIC       Elevated sugars previously, screen diabetes with a1c. I have discussed the diagnosis with the patient and the intended plan as seen in the above orders.   The patient has received an after-visit summary and questions were answered concerning future plans. Medication Side Effects and Warnings were discussed with patient. Follow-up Disposition:  Return in about 4 months (around 4/3/2018) for medicare wellness.       Melida Holbrook MD

## 2017-12-08 ENCOUNTER — TELEPHONE (OUTPATIENT)
Dept: INTERNAL MEDICINE CLINIC | Age: 82
End: 2017-12-08

## 2017-12-08 LAB
BUN SERPL-MCNC: 9 MG/DL (ref 8–27)
BUN/CREAT SERPL: 11 (ref 12–28)
CALCIUM SERPL-MCNC: 10.1 MG/DL (ref 8.7–10.3)
CHLORIDE SERPL-SCNC: 98 MMOL/L (ref 96–106)
CO2 SERPL-SCNC: 25 MMOL/L (ref 18–29)
CREAT SERPL-MCNC: 0.81 MG/DL (ref 0.57–1)
EST. AVERAGE GLUCOSE BLD GHB EST-MCNC: 120 MG/DL
GFR SERPLBLD CREATININE-BSD FMLA CKD-EPI: 66 ML/MIN/1.73
GFR SERPLBLD CREATININE-BSD FMLA CKD-EPI: 77 ML/MIN/1.73
GLUCOSE SERPL-MCNC: 143 MG/DL (ref 65–99)
HBA1C MFR BLD: 5.8 % (ref 4.8–5.6)
POTASSIUM SERPL-SCNC: 3 MMOL/L (ref 3.5–5.2)
SODIUM SERPL-SCNC: 143 MMOL/L (ref 134–144)
TSH SERPL DL<=0.005 MIU/L-ACNC: 1.78 UIU/ML (ref 0.45–4.5)

## 2017-12-14 DIAGNOSIS — G47.00 INSOMNIA, UNSPECIFIED TYPE: ICD-10-CM

## 2017-12-18 RX ORDER — TRAZODONE HYDROCHLORIDE 100 MG/1
TABLET ORAL
Qty: 30 TAB | Refills: 5 | Status: SHIPPED | OUTPATIENT
Start: 2017-12-18 | End: 2019-03-29 | Stop reason: DRUGHIGH

## 2018-05-25 RX ORDER — LEVOTHYROXINE SODIUM 88 UG/1
88 TABLET ORAL
Qty: 30 TAB | Refills: 0 | Status: SHIPPED | OUTPATIENT
Start: 2018-05-25 | End: 2018-06-30 | Stop reason: SDUPTHER

## 2018-07-02 RX ORDER — LEVOTHYROXINE SODIUM 88 UG/1
TABLET ORAL
Qty: 30 TAB | Refills: 0 | Status: SHIPPED | OUTPATIENT
Start: 2018-07-02 | End: 2018-07-29 | Stop reason: SDUPTHER

## 2018-07-31 RX ORDER — LEVOTHYROXINE SODIUM 88 UG/1
TABLET ORAL
Qty: 30 TAB | Refills: 0 | Status: SHIPPED | OUTPATIENT
Start: 2018-07-31 | End: 2019-03-29 | Stop reason: SDUPTHER

## 2019-03-29 ENCOUNTER — OFFICE VISIT (OUTPATIENT)
Dept: INTERNAL MEDICINE CLINIC | Age: 84
End: 2019-03-29

## 2019-03-29 VITALS
RESPIRATION RATE: 16 BRPM | TEMPERATURE: 96.3 F | HEART RATE: 52 BPM | HEIGHT: 63 IN | BODY MASS INDEX: 27.54 KG/M2 | WEIGHT: 155.4 LBS | OXYGEN SATURATION: 98 % | DIASTOLIC BLOOD PRESSURE: 75 MMHG | SYSTOLIC BLOOD PRESSURE: 176 MMHG

## 2019-03-29 DIAGNOSIS — I10 ESSENTIAL HYPERTENSION: ICD-10-CM

## 2019-03-29 DIAGNOSIS — K21.9 GASTROESOPHAGEAL REFLUX DISEASE WITHOUT ESOPHAGITIS: ICD-10-CM

## 2019-03-29 DIAGNOSIS — I10 ESSENTIAL HYPERTENSION, BENIGN: Primary | ICD-10-CM

## 2019-03-29 DIAGNOSIS — E03.9 HYPOTHYROIDISM, UNSPECIFIED TYPE: ICD-10-CM

## 2019-03-29 DIAGNOSIS — F32.A DEPRESSION, UNSPECIFIED DEPRESSION TYPE: ICD-10-CM

## 2019-03-29 DIAGNOSIS — E03.8 TSH (THYROID-STIMULATING HORMONE DEFICIENCY): ICD-10-CM

## 2019-03-29 DIAGNOSIS — Z79.899 ENCOUNTER FOR LONG-TERM (CURRENT) DRUG USE: ICD-10-CM

## 2019-03-29 DIAGNOSIS — R73.09 ELEVATED GLUCOSE: ICD-10-CM

## 2019-03-29 DIAGNOSIS — M47.816 OSTEOARTHRITIS OF LUMBAR SPINE, UNSPECIFIED SPINAL OSTEOARTHRITIS COMPLICATION STATUS: ICD-10-CM

## 2019-03-29 DIAGNOSIS — Z00.00 MEDICARE ANNUAL WELLNESS VISIT, SUBSEQUENT: ICD-10-CM

## 2019-03-29 DIAGNOSIS — Z13.39 SCREENING FOR ALCOHOLISM: ICD-10-CM

## 2019-03-29 DIAGNOSIS — Z83.3 FAMILY HISTORY OF DIABETES MELLITUS: ICD-10-CM

## 2019-03-29 DIAGNOSIS — G47.00 INSOMNIA, UNSPECIFIED TYPE: ICD-10-CM

## 2019-03-29 DIAGNOSIS — G47.01 INSOMNIA DUE TO MEDICAL CONDITION: ICD-10-CM

## 2019-03-29 RX ORDER — DULOXETIN HYDROCHLORIDE 20 MG/1
40 CAPSULE, DELAYED RELEASE ORAL
Qty: 180 CAP | Refills: 0 | Status: SHIPPED | OUTPATIENT
Start: 2019-03-29 | End: 2019-09-10

## 2019-03-29 RX ORDER — RANITIDINE 150 MG/1
150 TABLET, FILM COATED ORAL 2 TIMES DAILY
Qty: 180 TAB | Refills: 0 | Status: SHIPPED | OUTPATIENT
Start: 2019-03-29 | End: 2019-07-05 | Stop reason: SDUPTHER

## 2019-03-29 RX ORDER — TRAZODONE HYDROCHLORIDE 50 MG/1
50 TABLET ORAL
Qty: 90 TAB | Refills: 0 | Status: SHIPPED | OUTPATIENT
Start: 2019-03-29

## 2019-03-29 RX ORDER — HYDROCHLOROTHIAZIDE 25 MG/1
25 TABLET ORAL DAILY
Qty: 90 TAB | Refills: 0 | Status: SHIPPED | OUTPATIENT
Start: 2019-03-29 | End: 2019-07-05 | Stop reason: SDUPTHER

## 2019-03-29 RX ORDER — LEVOTHYROXINE SODIUM 88 UG/1
88 TABLET ORAL
Qty: 90 TAB | Refills: 0 | Status: SHIPPED | OUTPATIENT
Start: 2019-03-29 | End: 2019-07-05 | Stop reason: SDUPTHER

## 2019-03-29 NOTE — PROGRESS NOTES
Chief Complaint Patient presents with  Establish Care  
  needs med refils [facility] Annual Wellness Visit 1. Have you been to the ER, urgent care clinic since your last visit? Hospitalized since your last visit? no 
 
2. Have you seen or consulted any other health care providers outside of the [address] [place] [address] since your last visit? Include any pap smears or colon screening. No 
 
Patient has not had any meds in awhile per her. This is the Subsequent Medicare Annual Wellness Exam, performed 12 months or more after the Initial AWV or the last Subsequent AWV I have reviewed the patient's medical history in detail and updated the computerized patient record. History Past Medical History:  
Diagnosis Date  Hypertension Past Surgical History:  
Procedure Laterality Date  HX HYSTERECTOMY Current Outpatient Medications Medication Sig Dispense Refill  levothyroxine (SYNTHROID) 88 mcg tablet Take 1 Tab by mouth Daily (before breakfast). 90 Tab 0  
 traZODone (DESYREL) 50 mg tablet Take 1 Tab by mouth nightly. Indications: use for sleep 90 Tab 0  
 hydroCHLOROthiazide (HYDRODIURIL) 25 mg tablet Take 1 Tab by mouth daily. Indications: take 1 daily for blood pressure 90 Tab 0  
 DULoxetine (CYMBALTA) 20 mg capsule Take 2 Caps by mouth nightly. For depression and chronic pain 180 Cap 0  
 raNITIdine (ZANTAC) 150 mg tablet Take 1 Tab by mouth two (2) times a day. For heartburn 180 Tab 0  
 acetaminophen (TYLENOL EXTRA STRENGTH) 500 mg tablet Take  by mouth every six (6) hours as needed for Pain.  traMADol (ULTRAM) 50 mg tablet Take 50 mg by mouth every six (6) hours as needed for Pain.  Cholecalciferol, Vitamin D3, 50,000 unit cap Take 1 Cap by mouth daily. No Known Allergies Family History Problem Relation Age of Onset  Hypertension Mother  Hypertension Father Social History Tobacco Use  Smoking status: Former Smoker Packs/day: 0.25 Years: 3.00 Pack years: 0.75  Smokeless tobacco: Never Used  Tobacco comment: quit 1.5 years ago Substance Use Topics  Alcohol use: No  
 
Patient Active Problem List  
Diagnosis Code  Insomnia G47.00  Stress due to family tension Z63.8  Arthritis M19.90  Depression F32.9  
 Essential hypertension I10  
 Acquired hypothyroidism E03.9  ACP (advance care planning) Z71.89  
 Full code status Z78.9  Need for Zostavax administration Z23  Need for Streptococcus pneumoniae vaccination Z23 Depression Risk Factor Screening:  
 
3 most recent PHQ Screens 2/21/2017 Little interest or pleasure in doing things Several days Feeling down, depressed, irritable, or hopeless Several days Total Score PHQ 2 2 Alcohol Risk Factor Screening:  
 
 
Functional Ability and Level of Safety:  
Hearing Loss: \"Hearing is good. \"} Activities of Daily Living Fall Risk Fall Risk Assessment, last 12 mths 3/29/2019 Able to walk? Yes Fall in past 12 months? No  
Fall with injury? -  
Number of falls in past 12 months - Fall Risk Score - Cognitive Screening Evaluation of Cognitive Function: 
Has your family/caregiver stated any concerns about your memory: 
 
 
Patient Care Team  
Patient Care Team: 
Manjula Parks MD as PCP - [facility]) Assessment/Plan Education and counseling provided: 
 
 
Diagnoses and all orders for this visit: 1. Essential hypertension, benign -     LIPID PANEL 
-     URINALYSIS W/ RFLX MICROSCOPIC 
-     hydroCHLOROthiazide (HYDRODIURIL) 25 mg tablet; Take 1 Tab by mouth daily. Indications: take 1 daily for blood pressure 2. Medicare annual wellness visit, subsequent 3. Screening for alcoholism -     [place] ANNUAL ALCOHOL SCREEN 15 MIN 
 
4. Hypothyroidism, unspecified type -     T4, FREE 
-     TSH 3RD GENERATION 
-     levothyroxine (SYNTHROID) 88 mcg tablet;  Take 1 Tab by mouth Daily (before breakfast). 5. Family history of diabetes mellitus 
-     HEMOGLOBIN A1C WITH EAG 6. Encounter for long-term (current) drug use 
-     CBC WITH AUTOMATED DIFF 
-     METABOLIC PANEL, COMPREHENSIVE 7. TSH (thyroid-stimulating hormone deficiency) -     [place] ANNUAL ALCOHOL SCREEN 15 MIN 
 
8. Elevated glucose 
-     HEMOGLOBIN A1C WITH EAG 
-     METABOLIC PANEL, COMPREHENSIVE 9. Insomnia due to medical condition 
-     traZODone (DESYREL) 50 mg tablet; Take 1 Tab by mouth nightly. Indications: use for sleep 10. Essential hypertension 11. Insomnia, unspecified type -     DULoxetine (CYMBALTA) 20 mg capsule; Take 2 Caps by mouth nightly. For depression and chronic pain 12. Depression, unspecified depression type -     DULoxetine (CYMBALTA) 20 mg capsule; Take 2 Caps by mouth nightly. For depression and chronic pain 13. Gastroesophageal reflux disease without esophagitis 
-     raNITIdine (ZANTAC) 150 mg tablet; Take 1 Tab by mouth two (2) times a day. For heartburn 14. Osteoarthritis of lumbar spine, unspecified spinal osteoarthritis complication status Health Maintenance Due Topic Date Due  Shingrix Vaccine Age 50> (1 of 2) 07/11/1982  GLAUCOMA SCREENING Q2Y  07/11/1997  Bone Densitometry (Dexa) Screening  07/11/1997  Pneumococcal 65+ years (1 of 2 - PCV13) 07/11/1997

## 2019-03-29 NOTE — PATIENT INSTRUCTIONS
Medicare Wellness Visit, Female The best way to live healthy is to have a lifestyle where you eat a well-balanced diet, exercise regularly, limit alcohol use, and quit all forms of tobacco/nicotine, if applicable. Regular preventive services are another way to keep healthy. Preventive services (vaccines, screening tests, monitoring & exams) can help personalize your care plan, which helps you manage your own care. Screening tests can find health problems at the earliest stages, when they are easiest to treat. [facility] follows the current, evidence-based guidelines published by the [facility] Hayden Susana ([facility]) when recommending preventive services for our patients. Because we follow these guidelines, sometimes recommendations change over time as research supports it. (For example, mammograms used to be recommended annually. Even though Medicare will still pay for an annual mammogram, the newer guidelines recommend a mammogram every two years for women of average risk.) Of course, you and your doctor may decide to screen more often for some diseases, based on your risk and your health status. Preventive services for you include: - Medicare offers their members a free annual wellness visit, which is time for you and your primary care provider to discuss and plan for your preventive service needs. Take advantage of this benefit every year! 
-All adults over the age of 72 should receive the recommended pneumonia vaccines. Current USPSTF guidelines recommend a series of two vaccines for the best pneumonia protection.  
-All adults should have a flu vaccine yearly and a tetanus vaccine every 10 years. All adults age 61 and older should receive a shingles vaccine once in their lifetime.   
-A bone mass density test is recommended when a woman turns 65 to screen for osteoporosis. This test is only recommended one time, as a screening. Some providers will use this same test as a disease monitoring tool if you already have osteoporosis. -All adults age 38-68 who are overweight should have a diabetes screening test once every three years.  
-Other screening tests and preventive services for persons with diabetes include: an eye exam to screen for diabetic retinopathy, a kidney function test, a foot exam, and stricter control over your cholesterol.  
-Cardiovascular screening for adults with routine risk involves an electrocardiogram (ECG) at intervals determined by your doctor.  
-Colorectal cancer screenings should be done for adults age 54-65 with no increased risk factors for colorectal cancer. There are a number of acceptable methods of screening for this type of cancer. Each test has its own benefits and drawbacks. Discuss with your doctor what is most appropriate for you during your annual wellness visit. The different tests include: colonoscopy (considered the best screening method), a fecal occult blood test, a fecal DNA test, and sigmoidoscopy. -Breast cancer screenings are recommended every other year for women of normal risk, age 54-69. 
-Cervical cancer screenings for women over age 72 are only recommended with certain risk factors.  
-All adults born between [facility] should be screened once for Hepatitis C. Here is a list of your current Health Maintenance items (your personalized list of preventive services) with a due date: 
Health Maintenance Due Topic Date Due  Shingles Vaccine (1 of 2) 07/11/1982  Glaucoma Screening   07/11/1997  Bone Mineral Density   07/11/1997  Pneumococcal Vaccine (1 of 2 - PCV13) 07/11/1997 [address] [place] [address] Annual Well Visit  03/29/2018  Flu Vaccine  08/01/2018 Acute High Blood Pressure: Care Instructions Your Care Instructions Acute high blood pressure is very high blood pressure. It's a serious problem. Very high blood pressure can damage your brain, heart, eyes, and kidneys. You may have been given medicines to lower your blood pressure. You may have gotten them as pills or through a needle in one of your veins. This is called an IV. And maybe you were given other medicines too. These can be needed when high blood pressure causes other problems. To keep your blood pressure at a lower level, you may need to make healthy lifestyle changes. And you will probably need to take medicines. Be sure to follow up with your doctor about your blood pressure and what you can do about it. Follow-up care is a key part of your treatment and safety. Be sure to make and go to all appointments, and call your doctor if you are having problems. It's also a good idea to know your test results and keep a list of the medicines you take. How can you care for yourself at home? · See your doctor as often as he or she recommends. This is to make sure your blood pressure is under control. You will probably go at least 2 times a year. But it may be more often at first. 
· Take your blood pressure medicine exactly as prescribed. You may take one or more types. They include diuretics, beta-blockers, ACE inhibitors, calcium channel blockers, and angiotensin II receptor blockers. Call your doctor if you think you are having a problem with your medicine. · If you take blood pressure medicine, talk to your doctor before you take decongestants or anti-inflammatory medicine, such as ibuprofen. These can raise blood pressure. · Learn how to check your blood pressure at home. Check it often. · Ask your doctor if it's okay to drink alcohol. · Talk to your doctor about lifestyle changes that can help blood pressure. These include being active and not smoking. When should you call for help? Call 911 anytime you think you may need emergency care. This may mean having symptoms that suggest that your blood pressure is causing a serious heart or blood vessel problem. Your blood pressure may be over 180/120.  For example, call 911 if: 
  · You have symptoms of a heart attack. These may include: 
? Chest pain or pressure, or a strange feeling in the chest. 
? Sweating. ? Shortness of breath. ? Nausea or vomiting. ? Pain, pressure, or a strange feeling in the back, neck, jaw, or upper belly or in one or both shoulders or arms. ? Lightheadedness or sudden weakness. ? A fast or irregular heartbeat.  
  · You have symptoms of a stroke. These may include: 
? Sudden numbness, tingling, weakness, or loss of movement in your face, arm, or leg, especially on only one side of your body. ? Sudden vision changes. ? Sudden trouble speaking. ? Sudden confusion or trouble understanding simple statements. ? Sudden problems with walking or balance. ? A sudden, severe headache that is different from past headaches.  
  · You have severe back or belly pain.  
 Do not wait until your blood pressure comes down on its own. Get help right away. 
 Call your doctor now or seek immediate care if: 
  · Your blood pressure is much higher than normal (such as 180/120 or higher), but you don't have symptoms.  
  · You think high blood pressure is causing symptoms, such as: 
? Severe headache. 
? Blurry vision.  
 Watch closely for changes in your health, and be sure to contact your doctor if: 
  · Your blood pressure measures higher than your doctor recommends at least 2 times. That means the top number is higher or the bottom number is higher, or both.  
  · You think you may be having side effects from your blood pressure medicine. Where can you learn more? Go to http://coco-yulia.info/. Enter U741 in the search box to learn more about \"Acute High Blood Pressure: Care Instructions. \" Current as of: July 22, 2018 Content Version: 11.9 © 5959-1158 [employer].  Care instructions adapted under license by [employer] (which disclaims liability or warranty for this information). If you have questions about a medical condition or this instruction, always ask your healthcare professional. [address] any warranty or liability for your use of this information. Hypothyroidism: Care Instructions Your Care Instructions You have hypothyroidism, which means that your body is not making enough thyroid hormone. This hormone helps your body use energy. If your thyroid level is low, you may feel tired, be constipated, have an increase in your blood pressure, or have dry skin or memory problems. You may also get cold easily, even when it is warm. Women with low thyroid levels may have heavy menstrual periods. A blood test to find your thyroid-stimulating hormone (TSH) level is used to check for hypothyroidism. A high TSH level may mean that you have low thyroid. When your body is not making enough thyroid hormone, TSH levels rise in an effort to make the body produce more. The treatment for hypothyroidism is to take thyroid hormone pills. You should start to feel better in 1 to 2 weeks. But it can take several months to see changes in the TSH level. You will need regular visits with your doctor to make sure you have the right dose of medicine. Most people need treatment for the rest of their lives. You will need to see your doctor regularly to have blood tests and to make sure you are doing well. Follow-up care is a key part of your treatment and safety. Be sure to make and go to all appointments, and call your doctor if you are having problems. It's also a good idea to know your test results and keep a list of the medicines you take. How can you care for yourself at home? · Take your thyroid hormone medicine exactly as prescribed. Call your doctor if you think you are having a problem with your medicine. Most people do not have side effects if they take the right amount of medicine regularly. ? Take the medicine 30 minutes before breakfast, and do not take it with calcium, vitamins, or iron. ? Do not take extra doses of your thyroid medicine. It will not help you get better any faster, and it may cause side effects. ? If you forget to take a dose, do NOT take a double dose of medicine. Take your usual dose the next day. · Tell your doctor about all prescription, herbal, or over-the-counter products you take. · Take care of yourself. Eat a healthy diet, get enough sleep, and get regular exercise. When should you call for help? Call 911 anytime you think you may need emergency care. For example, call if: 
  · You passed out (lost consciousness).  
  · You have severe trouble breathing.  
  · You have a very slow heartbeat (less than 60 beats a minute).  
  · You have a low body temperature (95°F or below).  
 Call your doctor now or seek immediate medical care if: 
  · You feel tired, sluggish, or weak.  
  · You have trouble remembering things or concentrating.  
  · You do not begin to feel better 2 weeks after starting your medicine.  
 Watch closely for changes in your health, and be sure to contact your doctor if you have any problems. Where can you learn more? Go to http://coco-yulia.info/. Enter W301 in the search box to learn more about \"Hypothyroidism: Care Instructions. \" Current as of: March 14, 2018 Content Version: 11.9 © 0308-2759 [employer]. Care instructions adapted under license by [employer] (which disclaims liability or warranty for this information). If you have questions about a medical condition or this instruction, always ask your healthcare professional. [address] any warranty or liability for your use of this information.

## 2019-03-29 NOTE — PROGRESS NOTES
Subjective:  
 
David Min is a 80 y.o. female who presents for follow up of hypertension, depression, lumbar arthritis and insomnia. She has not had hctz in 4 days and has noted mild edema of her feet. She denies chest pain, dyspnea, cough, dysphagia, N/V, abdominal pain, suicidal thoughts. Her back pain prevents prolonged supine position. Flu and pneumonia vaccines are UTD. Mammograms done at [facility]. She lives alone in a senior citizen's building. She has 3 daughters, 1 is . Hobbies: reading, watching TV. Mignon is Sabianist. Falls [employer]afety[employer] Diet and Lifestyle: generally follows a low sodium diet, 15 pk-yr tobacco , has abstained from tobacco x 45 yr 
Home BP Monitoring: is not measured at home Cardiovascular ROS: taking medications as instructed, no medication side effects noted, patient does not perform home BP monitoring, no TIA's, no chest pain on exertion, no dyspnea on exertion, no swelling of ankles. New concerns:needs all medications refilled Patient Active Problem List  
Diagnosis Code  Insomnia G47.00  Stress due to family tension Z63.8  Arthritis M19.90  Depression F32.9  
 Essential hypertension I10  
 Acquired hypothyroidism E03.9  ACP (advance care planning) Z71.89  
 Full code status Z78.9  Need for Zostavax administration Z23  Need for Streptococcus pneumoniae vaccination Z23 Current Outpatient Medications Medication Sig Dispense Refill  levothyroxine (SYNTHROID) 88 mcg tablet Take 1 Tab by mouth Daily (before breakfast). 90 Tab 0  
 traZODone (DESYREL) 50 mg tablet Take 1 Tab by mouth nightly. Indications: use for sleep 90 Tab 0  
 hydroCHLOROthiazide (HYDRODIURIL) 25 mg tablet Take 1 Tab by mouth daily. Indications: take 1 daily for blood pressure 90 Tab 0  
 DULoxetine (CYMBALTA) 20 mg capsule Take 2 Caps by mouth nightly.  For depression and chronic pain 180 Cap 0  
  raNITIdine (ZANTAC) 150 mg tablet Take 1 Tab by mouth two (2) times a day. For heartburn 180 Tab 0  
 acetaminophen (TYLENOL EXTRA STRENGTH) 500 mg tablet Take  by mouth every six (6) hours as needed for Pain.  traMADol (ULTRAM) 50 mg tablet Take 50 mg by mouth every six (6) hours as needed for Pain.  Cholecalciferol, Vitamin D3, 50,000 unit cap Take 1 Cap by mouth daily. No Known Allergies Past Medical History:  
Diagnosis Date  Hypertension Past Surgical History:  
Procedure Laterality Date  HX HYSTERECTOMY Family History Problem Relation Age of Onset  Hypertension Mother  Hypertension Father Social History Tobacco Use  Smoking status: Former Smoker Packs/day: 0.25 Years: 3.00 Pack years: 0.75  Smokeless tobacco: Never Used  Tobacco comment: quit 1.5 years ago Substance Use Topics  Alcohol use: No  
  
 
No results found for: WBC, WBCT, WBCPOC, HGB, HGBPOC, HCT, HCTPOC, PLT, PLTPOC, MCV, MCVPOC, HGBEXT, HCTEXT, PLTEXT No results found for: CHOL, CHOLPOCT, HDL, LDL, LDLC, LDLCPOC, LDLCEXT, TRIGL, TGLPOCT, CHHD, CHHDX Lab Results Component Value Date/Time GFR est non-AA 66 12/07/2017 02:00 PM  
 GFR est AA 77 12/07/2017 02:00 PM  
 Creatinine 0.81 12/07/2017 02:00 PM  
 BUN 9 12/07/2017 02:00 PM  
 Sodium 143 12/07/2017 02:00 PM  
 Potassium 3.0 (L) 12/07/2017 02:00 PM  
 Chloride 98 12/07/2017 02:00 PM  
 CO2 25 12/07/2017 02:00 PM  
 
Lab Results Component Value Date/Time TSH 1.780 12/07/2017 02:00 PM  
  
Lab Results Component Value Date/Time  Sodium 143 12/07/2017 02:00 PM  
 Potassium 3.0 (L) 12/07/2017 02:00 PM  
 Chloride 98 12/07/2017 02:00 PM  
 CO2 25 12/07/2017 02:00 PM  
 Glucose 143 (H) 12/07/2017 02:00 PM  
 BUN 9 12/07/2017 02:00 PM  
 Creatinine 0.81 12/07/2017 02:00 PM  
 BUN/Creatinine ratio 11 (L) 12/07/2017 02:00 PM  
 GFR est AA 77 12/07/2017 02:00 PM  
 GFR est non-AA 66 12/07/2017 02:00 PM  
 Calcium 10.1 12/07/2017 02:00 PM  
   
 
Review of Systems, additional: 
Gastrointestinal: positive for reflux symptoms, lower back pain (x-rays show djd only), mild foot swelling Objective:  
 
Visit Vitals /75 (BP 1 Location: Left arm, BP Patient Position: Sitting) Pulse (!) 52 Temp 96.3 °F (35.7 °C) (Oral) Resp 16 Ht 5' 3\" (1.6 m) Wt 155 lb 6.4 oz (70.5 kg) SpO2 98% BMI 27.53 kg/m² Appearance: alert, well appearing, and in no distress and normal appearing weight. General exam: BP noted to be moderately elevated today in office, NECK-mass-bruit, CVS exam S1, S2 normal, no gallop, no murmur, chest clear, no JVD, no HSM, no edema. LUNGS clear ABDOMEN soft-mass EXT no edema Lab review: labs reviewed, I note that comprehensive metabolic panel normal, abnormal with glucose 160mg/dL. Assessment/Plan: Hypertension control uncertain. Lumbar djd symptomatic off medications GERD symptomatic off H2 blocker but no red flag symptoms reported Depression with insomnia, stable on medication 
 
current treatment plan is effective, no change in therapy 
orders and follow up as documented in patient record 
reviewed medications and side effects in detail. instructed to take 50 mg trazodone hs and discontinue 100 mg hs 
rto 1 mo for ecg, urinalysis ICD-10-CM ICD-9-CM 1. Essential hypertension, benign I10 401.1 LIPID PANEL  
   URINALYSIS W/ RFLX MICROSCOPIC  
   hydroCHLOROthiazide (HYDRODIURIL) 25 mg tablet 2. Medicare annual wellness visit, subsequent Z00.00 V70.0 3. Screening for alcoholism Z13.39 V79.1 [place] ANNUAL ALCOHOL SCREEN 15 MIN  
4. Hypothyroidism, unspecified type E03.9 244.9 T4, FREE  
   TSH 3RD GENERATION  
   levothyroxine (SYNTHROID) 88 mcg tablet 5. Family history of diabetes mellitus Z83.3 V18.0 HEMOGLOBIN A1C WITH EAG 6.  Encounter for long-term (current) drug use Z79.899 V58.69 CBC WITH AUTOMATED DIFF  
   METABOLIC PANEL, COMPREHENSIVE  
 7. TSH (thyroid-stimulating hormone deficiency) E03.8 244.8 [place] ANNUAL ALCOHOL SCREEN 15 MIN  
8. Elevated glucose R73.09 790.29 HEMOGLOBIN A1C WITH EAG  
   METABOLIC PANEL, COMPREHENSIVE 9. Insomnia due to medical condition G47.01 327.01 traZODone (DESYREL) 50 mg tablet 10. Essential hypertension I10 401.9 11. Insomnia, unspecified type G47.00 780.52 DULoxetine (CYMBALTA) 20 mg capsule 12. Depression, unspecified depression type F32.9 311 DULoxetine (CYMBALTA) 20 mg capsule 13. Gastroesophageal reflux disease without esophagitis K21.9 530.81 raNITIdine (ZANTAC) 150 mg tablet

## 2019-03-30 LAB
ALBUMIN SERPL-MCNC: 4.2 G/DL (ref 3.5–4.7)
ALBUMIN/GLOB SERPL: 1.6 {RATIO} (ref 1.2–2.2)
ALP SERPL-CCNC: 59 IU/L (ref 39–117)
ALT SERPL-CCNC: 20 IU/L (ref 0–32)
APPEARANCE UR: ABNORMAL
AST SERPL-CCNC: 46 IU/L (ref 0–40)
BASOPHILS # BLD AUTO: 0 X10E3/UL (ref 0–0.2)
BASOPHILS NFR BLD AUTO: 1 %
BILIRUB SERPL-MCNC: 0.4 MG/DL (ref 0–1.2)
BILIRUB UR QL STRIP: NEGATIVE
BUN SERPL-MCNC: 12 MG/DL (ref 8–27)
BUN/CREAT SERPL: 11 (ref 12–28)
CALCIUM SERPL-MCNC: 10 MG/DL (ref 8.7–10.3)
CHLORIDE SERPL-SCNC: 106 MMOL/L (ref 96–106)
CHOLEST SERPL-MCNC: 262 MG/DL (ref 100–199)
CO2 SERPL-SCNC: 21 MMOL/L (ref 20–29)
COLOR UR: YELLOW
CREAT SERPL-MCNC: 1.11 MG/DL (ref 0.57–1)
EOSINOPHIL # BLD AUTO: 0.1 X10E3/UL (ref 0–0.4)
EOSINOPHIL NFR BLD AUTO: 3 %
ERYTHROCYTE [DISTWIDTH] IN BLOOD BY AUTOMATED COUNT: 17.8 % (ref 12.3–15.4)
EST. AVERAGE GLUCOSE BLD GHB EST-MCNC: 128 MG/DL
GLOBULIN SER CALC-MCNC: 2.7 G/DL (ref 1.5–4.5)
GLUCOSE SERPL-MCNC: 113 MG/DL (ref 65–99)
GLUCOSE UR QL: NEGATIVE
HBA1C MFR BLD: 6.1 % (ref 4.8–5.6)
HCT VFR BLD AUTO: 36.9 % (ref 34–46.6)
HDLC SERPL-MCNC: 126 MG/DL
HGB BLD-MCNC: 12.2 G/DL (ref 11.1–15.9)
HGB UR QL STRIP: NEGATIVE
IMM GRANULOCYTES # BLD AUTO: 0 X10E3/UL (ref 0–0.1)
IMM GRANULOCYTES NFR BLD AUTO: 0 %
KETONES UR QL STRIP: ABNORMAL
LDLC SERPL CALC-MCNC: 113 MG/DL (ref 0–99)
LEUKOCYTE ESTERASE UR QL STRIP: NEGATIVE
LYMPHOCYTES # BLD AUTO: 0.8 X10E3/UL (ref 0.7–3.1)
LYMPHOCYTES NFR BLD AUTO: 25 %
MCH RBC QN AUTO: 31.5 PG (ref 26.6–33)
MCHC RBC AUTO-ENTMCNC: 33.1 G/DL (ref 31.5–35.7)
MCV RBC AUTO: 95 FL (ref 79–97)
MICRO URNS: ABNORMAL
MONOCYTES # BLD AUTO: 0.3 X10E3/UL (ref 0.1–0.9)
MONOCYTES NFR BLD AUTO: 10 %
NEUTROPHILS # BLD AUTO: 2 X10E3/UL (ref 1.4–7)
NEUTROPHILS NFR BLD AUTO: 61 %
NITRITE UR QL STRIP: NEGATIVE
PH UR STRIP: 7.5 [PH] (ref 5–7.5)
PLATELET # BLD AUTO: 181 X10E3/UL (ref 150–379)
POTASSIUM SERPL-SCNC: 4.6 MMOL/L (ref 3.5–5.2)
PROT SERPL-MCNC: 6.9 G/DL (ref 6–8.5)
PROT UR QL STRIP: ABNORMAL
RBC # BLD AUTO: 3.87 X10E6/UL (ref 3.77–5.28)
SODIUM SERPL-SCNC: 142 MMOL/L (ref 134–144)
SP GR UR: 1.03 (ref 1–1.03)
T4 FREE SERPL-MCNC: 0.26 NG/DL (ref 0.82–1.77)
TRIGL SERPL-MCNC: 114 MG/DL (ref 0–149)
TSH SERPL DL<=0.005 MIU/L-ACNC: 45.61 UIU/ML (ref 0.45–4.5)
UROBILINOGEN UR STRIP-MCNC: 0.2 MG/DL (ref 0.2–1)
VLDLC SERPL CALC-MCNC: 23 MG/DL (ref 5–40)
WBC # BLD AUTO: 3.3 X10E3/UL (ref 3.4–10.8)

## 2019-07-05 DIAGNOSIS — I10 ESSENTIAL HYPERTENSION, BENIGN: ICD-10-CM

## 2019-07-05 DIAGNOSIS — K21.9 GASTROESOPHAGEAL REFLUX DISEASE WITHOUT ESOPHAGITIS: ICD-10-CM

## 2019-07-05 DIAGNOSIS — G47.01 INSOMNIA DUE TO MEDICAL CONDITION: ICD-10-CM

## 2019-07-05 DIAGNOSIS — E03.9 HYPOTHYROIDISM, UNSPECIFIED TYPE: ICD-10-CM

## 2019-07-05 RX ORDER — TRAZODONE HYDROCHLORIDE 50 MG/1
50 TABLET ORAL
Qty: 90 TAB | Refills: 0 | OUTPATIENT
Start: 2019-07-05

## 2019-07-05 RX ORDER — LEVOTHYROXINE SODIUM 88 UG/1
88 TABLET ORAL
Qty: 14 TAB | Refills: 0 | Status: SHIPPED | OUTPATIENT
Start: 2019-07-05 | End: 2019-09-10 | Stop reason: SDUPTHER

## 2019-07-05 RX ORDER — RANITIDINE 150 MG/1
150 TABLET, FILM COATED ORAL 2 TIMES DAILY
Qty: 28 TAB | Refills: 0 | Status: SHIPPED | OUTPATIENT
Start: 2019-07-05

## 2019-07-05 RX ORDER — HYDROCHLOROTHIAZIDE 25 MG/1
25 TABLET ORAL DAILY
Qty: 14 TAB | Refills: 0 | Status: SHIPPED | OUTPATIENT
Start: 2019-07-05 | End: 2019-09-10 | Stop reason: SDUPTHER

## 2019-09-10 ENCOUNTER — OFFICE VISIT (OUTPATIENT)
Dept: INTERNAL MEDICINE CLINIC | Age: 84
End: 2019-09-10

## 2019-09-10 VITALS
HEIGHT: 63 IN | DIASTOLIC BLOOD PRESSURE: 82 MMHG | TEMPERATURE: 98.3 F | WEIGHT: 143.9 LBS | SYSTOLIC BLOOD PRESSURE: 184 MMHG | OXYGEN SATURATION: 98 % | RESPIRATION RATE: 16 BRPM | HEART RATE: 57 BPM | BODY MASS INDEX: 25.5 KG/M2

## 2019-09-10 DIAGNOSIS — I10 ESSENTIAL HYPERTENSION, BENIGN: Primary | ICD-10-CM

## 2019-09-10 DIAGNOSIS — Z79.899 ENCOUNTER FOR LONG-TERM (CURRENT) USE OF OTHER MEDICATIONS: ICD-10-CM

## 2019-09-10 DIAGNOSIS — E78.5 HYPERLIPIDEMIA, UNSPECIFIED HYPERLIPIDEMIA TYPE: ICD-10-CM

## 2019-09-10 DIAGNOSIS — D72.819 LEUKOPENIA, UNSPECIFIED TYPE: ICD-10-CM

## 2019-09-10 DIAGNOSIS — E03.9 HYPOTHYROIDISM, UNSPECIFIED TYPE: ICD-10-CM

## 2019-09-10 DIAGNOSIS — E03.9 ACQUIRED HYPOTHYROIDISM: ICD-10-CM

## 2019-09-10 RX ORDER — LEVOTHYROXINE SODIUM 88 UG/1
88 TABLET ORAL
Qty: 14 TAB | Refills: 0 | Status: SHIPPED | OUTPATIENT
Start: 2019-09-10 | End: 2019-09-18 | Stop reason: SDUPTHER

## 2019-09-10 RX ORDER — HYDROCHLOROTHIAZIDE 25 MG/1
25 TABLET ORAL DAILY
Qty: 14 TAB | Refills: 0 | Status: SHIPPED | OUTPATIENT
Start: 2019-09-10 | End: 2019-09-18 | Stop reason: SDUPTHER

## 2019-09-10 RX ORDER — AMLODIPINE BESYLATE 5 MG/1
5 TABLET ORAL DAILY
Qty: 30 TAB | Refills: 1 | Status: SHIPPED | OUTPATIENT
Start: 2019-09-10 | End: 2019-09-18 | Stop reason: SDUPTHER

## 2019-09-10 NOTE — PROGRESS NOTES
SPORTS MEDICINE AND PRIMARY CARE  Everardo Pereyra MD  [address] [address]    Chief Complaint   Patient presents with    Hypertension     Patient is here for a medication refill. Per patient she hasn't had any and now her feet are swollen. SUBJECTIVE:    Marco A Sandhu is a 80 y.o. female who presents for follow up of hypertension, hyperlipidemia and hypothyroidism. .  Last evaluated 6 mo ago. Did not attend follow up appointment as scheduled. Diet and Lifestyle: generally follows a low fat low cholesterol diet, not attempting to follow a low sodium diet, sedentary, nonsmoker  Home BP Monitoring: is not measured at home    Cardiovascular ROS: not taking medications regularly as instructed, no medication side effects noted, patient does not perform home BP monitoring, no TIA's, no chest pain on exertion, no dyspnea on exertion, noting swelling of ankles, no orthopnea or paroxysmal nocturnal dyspnea, no palpitations, no intermittent claudication symptoms. New concerns: chronic medication refills. Presents 2 empty bottles. Visit Vitals  /77   Pulse 95   Temp 98.3 °F (36.8 °C)   Resp 16   Ht 5' 3\" (1.6 m)   Wt 143 lb 14.4 oz (65.3 kg)   SpO2 98%   BMI 25.49 kg/m²       Current Outpatient Medications   Medication Sig Dispense Refill    amLODIPine (NORVASC) 5 mg tablet Take 1 Tab by mouth daily. 30 Tab 1    hydroCHLOROthiazide (HYDRODIURIL) 25 mg tablet Take 1 Tab by mouth daily. Appointment required for additional refills  Indications: take 1 daily for blood pressure 14 Tab 0    levothyroxine (SYNTHROID) 88 mcg tablet Take 1 Tab by mouth Daily (before breakfast). Appointment required for additional refills 14 Tab 0    raNITIdine (ZANTAC) 150 mg tablet Take 1 Tab by mouth two (2) times a day. For heartburn. Appointment required for additional refills 28 Tab 0    traZODone (DESYREL) 50 mg tablet Take 1 Tab by mouth nightly.  Indications: use for sleep 90 Tab 0    acetaminophen (TYLENOL EXTRA STRENGTH) 500 mg tablet Take  by mouth every six (6) hours as needed for Pain.  traMADol (ULTRAM) 50 mg tablet Take 50 mg by mouth every six (6) hours as needed for Pain. Past Medical History:   Diagnosis Date    Hypertension      Past Surgical History:   Procedure Laterality Date    HX HYSTERECTOMY       No Known Allergies    REVIEW OF SYSTEMS:  General: negative for - chills or fever  ENT: negative for - headaches, nasal congestion or tinnitus  Respiratory: negative for - cough, hemoptysis, shortness of breath or wheezing  Cardiovascular : negative for - chest pain, edema, palpitations or shortness of breath  Gastrointestinal: negative for - abdominal pain, blood in stools, heartburn or nausea/vomiting  Genito-Urinary: no dysuria, trouble voiding, or hematuria  Musculoskeletal: positive for - gait disturbance/uses cane, joint pain, joint stiffness or joint swelling  Neurological: no TIA or stroke symptoms  Hematologic: no bruises, no bleeding, no swollen glands  Integument: dry  Endocrine:+12 lb weight loss since 3/2019; no malaise/lethargy      Social History     Socioeconomic History    Marital status: UNKNOWN     Spouse name: Not on file    Number of children: Not on file    Years of education: Not on file    Highest education level: Not on file   Tobacco Use    Smoking status: Former Smoker     Packs/day: 0.25     Years: 3.00     Pack years: 0.75    Smokeless tobacco: Never Used    Tobacco comment: quit 1.5 years ago   Substance and Sexual Activity    Alcohol use: No    Drug use: No    Sexual activity: Never     Family History   Problem Relation Age of Onset    Hypertension Mother     Hypertension Father        OBJECTIVE:     Visit Vitals  /82   Pulse (!) 57   Temp 98.3 °F (36.8 °C)   Resp 16   Ht 5' 3\" (1.6 m)   Wt 143 lb 14.4 oz (65.3 kg)   SpO2 98%   BMI 25.49 kg/m²     Appearance: alert, well appearing, and in no distress.   General exam: CVS exam BP noted to be well controlled today in office, S1, S2 normal, no gallop, no murmur, chest clear, no JVD, no HSM, minor pedal lt dorsal foot edema, peripheral vascular exam both carotids normal upstroke without bruits, aorta not palpable, neurological exam alert, oriented, normal speech, no focal findings or movement disorder noted.     Office Visit on 09/10/2019   Component Date Value Ref Range Status    Glucose 09/10/2019 113* 65 - 99 mg/dL Final    BUN 09/10/2019 10  8 - 27 mg/dL Final    Creatinine 09/10/2019 1.02* 0.57 - 1.00 mg/dL Final    GFR est non-AA 09/10/2019 50* >59 mL/min/1.73 Final    GFR est AA 09/10/2019 57* >59 mL/min/1.73 Final    BUN/Creatinine ratio 09/10/2019 10* 12 - 28 Final    Sodium 09/10/2019 144  134 - 144 mmol/L Final    Potassium 09/10/2019 4.3  3.5 - 5.2 mmol/L Final    Chloride 09/10/2019 100  96 - 106 mmol/L Final    CO2 09/10/2019 22  20 - 29 mmol/L Final    Calcium 09/10/2019 10.0  8.7 - 10.3 mg/dL Final    Cholesterol, total 09/10/2019 246* 100 - 199 mg/dL Final    Triglyceride 09/10/2019 87  0 - 149 mg/dL Final    HDL Cholesterol 09/10/2019 130  >39 mg/dL Final    VLDL, calculated 09/10/2019 17  5 - 40 mg/dL Final    LDL, calculated 09/10/2019 99  0 - 99 mg/dL Final    TSH 09/10/2019 33.160* 0.450 - 4.500 uIU/mL Final    T4, Free 09/10/2019 0.35* 0.82 - 1.77 ng/dL Final    WBC 09/10/2019 3.4  3.4 - 10.8 x10E3/uL Final    RBC 09/10/2019 4.22  3.77 - 5.28 x10E6/uL Final    HGB 09/10/2019 13.4  11.1 - 15.9 g/dL Final    HCT 09/10/2019 41.2  34.0 - 46.6 % Final    MCV 09/10/2019 98* 79 - 97 fL Final    MCH 09/10/2019 31.8  26.6 - 33.0 pg Final    MCHC 09/10/2019 32.5  31.5 - 35.7 g/dL Final    RDW 09/10/2019 19.5* 12.3 - 15.4 % Final    PLATELET 09/25/6228 662  150 - 450 x10E3/uL Final    NEUTROPHILS 09/10/2019 57  Not Estab. % Final    Lymphocytes 09/10/2019 25  Not Estab. % Final    MONOCYTES 09/10/2019 15  Not Estab. % Final    EOSINOPHILS 09/10/2019 3  Not Estab. % Final    BASOPHILS 09/10/2019 0  Not Estab. % Final    ABS. NEUTROPHILS 09/10/2019 1.9  1.4 - 7.0 x10E3/uL Final    Abs Lymphocytes 09/10/2019 0.8  0.7 - 3.1 x10E3/uL Final    ABS. MONOCYTES 09/10/2019 0.5  0.1 - 0.9 x10E3/uL Final    ABS. EOSINOPHILS 09/10/2019 0.1  0.0 - 0.4 x10E3/uL Final    ABS. BASOPHILS 09/10/2019 0.0  0.0 - 0.2 x10E3/uL Final    IMMATURE GRANULOCYTES 09/10/2019 0  Not Estab. % Final    ABS. IMM. GRANS. 09/10/2019 0.0  0.0 - 0.1 x10E3/uL Final       ASSESSMENT:   1. Essential hypertension, benign -uncontrolled, noncompliant with medication regimen   2. Hypothyroidism, unspecified type - out of medications   3. Acquired hypothyroidism    4. Encounter for long-term (current) use of other medications    5. Hyperlipidemia, unspecified hyperlipidemia type    6. Leukopenia, unspecified type        I have discussed the diagnosis with the patient and the intended plan as seen in the  orders. The patient understands and agees with the plan. The patient has   received an after visit summary and questions were answered concerning  future plans  Patient labs and/or xrays were reviewed  Past records were reviewed. PLAN:  .  Orders Placed This Encounter    METABOLIC PANEL, BASIC    LIPID PANEL    TSH 3RD GENERATION    T4, FREE    CBC WITH AUTOMATED DIFF    amLODIPine (NORVASC) 5 mg tablet #90    hydroCHLOROthiazide (HYDRODIURIL) 25 mg tablet #90    levothyroxine (SYNTHROID) 88 mcg tablet #90       Follow-up and Dispositions    · Return in about 2 weeks (around 9/24/2019) for blood pressure follow up.

## 2019-09-10 NOTE — PROGRESS NOTES
Chief Complaint   Patient presents with    Hypertension     Patient is here for a medication refill. Per patient she hasn't had any and now her feet are swollen. 1. Have you been to the ER, urgent care clinic since your last visit? Hospitalized since your last visit? No    2. Have you seen or consulted any other health care providers outside of the [address] [place] [address] since your last visit? Include any pap smears or colon screening.  No

## 2019-09-11 LAB
BASOPHILS # BLD AUTO: 0 X10E3/UL (ref 0–0.2)
BASOPHILS NFR BLD AUTO: 0 %
BUN SERPL-MCNC: 10 MG/DL (ref 8–27)
BUN/CREAT SERPL: 10 (ref 12–28)
CALCIUM SERPL-MCNC: 10 MG/DL (ref 8.7–10.3)
CHLORIDE SERPL-SCNC: 100 MMOL/L (ref 96–106)
CHOLEST SERPL-MCNC: 246 MG/DL (ref 100–199)
CO2 SERPL-SCNC: 22 MMOL/L (ref 20–29)
CREAT SERPL-MCNC: 1.02 MG/DL (ref 0.57–1)
EOSINOPHIL # BLD AUTO: 0.1 X10E3/UL (ref 0–0.4)
EOSINOPHIL NFR BLD AUTO: 3 %
ERYTHROCYTE [DISTWIDTH] IN BLOOD BY AUTOMATED COUNT: 19.5 % (ref 12.3–15.4)
GLUCOSE SERPL-MCNC: 113 MG/DL (ref 65–99)
HCT VFR BLD AUTO: 41.2 % (ref 34–46.6)
HDLC SERPL-MCNC: 130 MG/DL
HGB BLD-MCNC: 13.4 G/DL (ref 11.1–15.9)
IMM GRANULOCYTES # BLD AUTO: 0 X10E3/UL (ref 0–0.1)
IMM GRANULOCYTES NFR BLD AUTO: 0 %
LDLC SERPL CALC-MCNC: 99 MG/DL (ref 0–99)
LYMPHOCYTES # BLD AUTO: 0.8 X10E3/UL (ref 0.7–3.1)
LYMPHOCYTES NFR BLD AUTO: 25 %
MCH RBC QN AUTO: 31.8 PG (ref 26.6–33)
MCHC RBC AUTO-ENTMCNC: 32.5 G/DL (ref 31.5–35.7)
MCV RBC AUTO: 98 FL (ref 79–97)
MONOCYTES # BLD AUTO: 0.5 X10E3/UL (ref 0.1–0.9)
MONOCYTES NFR BLD AUTO: 15 %
NEUTROPHILS # BLD AUTO: 1.9 X10E3/UL (ref 1.4–7)
NEUTROPHILS NFR BLD AUTO: 57 %
PLATELET # BLD AUTO: 241 X10E3/UL (ref 150–450)
POTASSIUM SERPL-SCNC: 4.3 MMOL/L (ref 3.5–5.2)
RBC # BLD AUTO: 4.22 X10E6/UL (ref 3.77–5.28)
SODIUM SERPL-SCNC: 144 MMOL/L (ref 134–144)
T4 FREE SERPL-MCNC: 0.35 NG/DL (ref 0.82–1.77)
TRIGL SERPL-MCNC: 87 MG/DL (ref 0–149)
TSH SERPL DL<=0.005 MIU/L-ACNC: 33.16 UIU/ML (ref 0.45–4.5)
VLDLC SERPL CALC-MCNC: 17 MG/DL (ref 5–40)
WBC # BLD AUTO: 3.4 X10E3/UL (ref 3.4–10.8)

## 2019-09-25 PROBLEM — Z23 NEED FOR ZOSTAVAX ADMINISTRATION: Status: RESOLVED | Noted: 2017-03-28 | Resolved: 2019-09-25

## 2019-09-25 PROBLEM — Z23 NEED FOR STREPTOCOCCUS PNEUMONIAE VACCINATION: Status: RESOLVED | Noted: 2017-03-28 | Resolved: 2019-09-25

## 2020-01-28 DIAGNOSIS — E03.9 HYPOTHYROIDISM, UNSPECIFIED TYPE: ICD-10-CM

## 2020-01-28 DIAGNOSIS — I10 ESSENTIAL HYPERTENSION, BENIGN: ICD-10-CM

## 2020-02-05 RX ORDER — AMLODIPINE BESYLATE 5 MG/1
5 TABLET ORAL DAILY
Qty: 30 TAB | Refills: 1 | Status: SHIPPED | OUTPATIENT
Start: 2020-02-05 | End: 2020-04-23 | Stop reason: SDUPTHER

## 2020-02-05 RX ORDER — HYDROCHLOROTHIAZIDE 25 MG/1
25 TABLET ORAL DAILY
Qty: 90 TAB | Refills: 0 | Status: SHIPPED | OUTPATIENT
Start: 2020-02-05 | End: 2020-06-02 | Stop reason: SDUPTHER

## 2020-02-05 RX ORDER — LEVOTHYROXINE SODIUM 88 UG/1
88 TABLET ORAL
Qty: 90 TAB | Refills: 0 | Status: SHIPPED | OUTPATIENT
Start: 2020-02-05 | End: 2020-06-01 | Stop reason: SDUPTHER

## 2020-04-13 ENCOUNTER — VIRTUAL VISIT (OUTPATIENT)
Dept: INTERNAL MEDICINE CLINIC | Age: 85
End: 2020-04-13

## 2020-04-13 DIAGNOSIS — I10 ESSENTIAL HYPERTENSION: ICD-10-CM

## 2020-04-13 DIAGNOSIS — I10 ESSENTIAL HYPERTENSION, BENIGN: ICD-10-CM

## 2020-04-13 DIAGNOSIS — Z13.39 SCREENING FOR ALCOHOLISM: ICD-10-CM

## 2020-04-13 DIAGNOSIS — E03.9 ACQUIRED HYPOTHYROIDISM: ICD-10-CM

## 2020-04-13 DIAGNOSIS — Z13.6 SCREENING FOR ISCHEMIC HEART DISEASE: ICD-10-CM

## 2020-04-13 DIAGNOSIS — Z00.00 MEDICARE ANNUAL WELLNESS VISIT, SUBSEQUENT: Primary | ICD-10-CM

## 2020-04-13 DIAGNOSIS — Z13.31 SCREENING FOR DEPRESSION: ICD-10-CM

## 2020-04-13 DIAGNOSIS — E03.9 HYPOTHYROIDISM, UNSPECIFIED TYPE: ICD-10-CM

## 2020-04-13 NOTE — PROGRESS NOTES
Chief Complaint   Patient presents with   [address] [place] [address] Annual Wellness Visit     1. Have you been to the ER, urgent care clinic since your last visit? Hospitalized since your last visit? No    2. Have you seen or consulted any other health care providers outside of the [address] [place] [address] since your last visit? Include any pap smears or colon screening. No      This is the Subsequent Medicare Annual Wellness Exam, performed 12 months or more after the Initial AWV or the last Subsequent AWV    Consent: Veronarajan Mckeon, who was seen by synchronous (real-time) audio-video technology, and/or her healthcare decision maker, is aware that this patient-initiated, Telehealth encounter on 4/13/2020 is a billable service. While AWVs are fully covered by Medicare, any services rendered on this date that are not included in an AWV are subject to additional billing, with coverage as determined by her insurance carrier. She is aware that she may receive a bill for any such additional services and has provided verbal consent to proceed: Yes. I have reviewed the patient's medical history in detail and updated the computerized patient record. History     Patient Active Problem List   Diagnosis Code    Insomnia G47.00    Stress due to family tension Z63.8    Arthritis M19.90    Depression F32.9    Essential hypertension I10    Acquired hypothyroidism E03.9    ACP (advance care planning) Z71.89    Full code status Z78.9     Past Medical History:   Diagnosis Date    Hypertension       Past Surgical History:   Procedure Laterality Date    HX HYSTERECTOMY       Current Outpatient Medications   Medication Sig Dispense Refill    amLODIPine (NORVASC) 5 mg tablet Take 1 Tab by mouth daily. 30 Tab 1    hydroCHLOROthiazide (HYDRODIURIL) 25 mg tablet Take 1 Tab by mouth daily.  Appointment required for additional refills  Indications: take 1 daily for blood pressure 90 Tab 0    levothyroxine (SYNTHROID) 88 mcg tablet Take 1 Tab by mouth Daily (before breakfast). Appointment required for additional refills 90 Tab 0    raNITIdine (ZANTAC) 150 mg tablet Take 1 Tab by mouth two (2) times a day. For heartburn. Appointment required for additional refills 28 Tab 0    traZODone (DESYREL) 50 mg tablet Take 1 Tab by mouth nightly. Indications: use for sleep 90 Tab 0    acetaminophen (TYLENOL EXTRA STRENGTH) 500 mg tablet Take  by mouth every six (6) hours as needed for Pain.  traMADol (ULTRAM) 50 mg tablet Take 50 mg by mouth every six (6) hours as needed for Pain. No Known Allergies    Family History   Problem Relation Age of Onset    Hypertension Mother     Hypertension Father      Social History     Tobacco Use    Smoking status: Former Smoker     Packs/day: 0.25     Years: 3.00     Pack years: 0.75    Smokeless tobacco: Never Used    Tobacco comment: quit 1.5 years ago   Substance Use Topics    Alcohol use: No       Depression Risk Factor Screening:     3 most recent PHQ Screens 2/21/2017   Little interest or pleasure in doing things Several days   Feeling down, depressed, irritable, or hopeless Several days   Total Score PHQ 2 2       Alcohol Risk Factor Screening:   Do you average 1 drink per night or more than 7 drinks a week:  No    On any one occasion in the past three months have you have had more than 3 drinks containing alcohol:  No      Functional Ability and Level of Safety:   Hearing: Hearing is good. Activities of Daily Living: The home contains: grab bars  Patient does total self care    Ambulation: WNL    Fall Risk:  Fall Risk Assessment, last 12 mths 9/10/2019   Able to walk? Yes   Fall in past 12 months?  No   Fall with injury? -   Number of falls in past 12 months -   Fall Risk Score -       Abuse Screen:  Patient is not abused    Cognitive Screening   Has your family/caregiver stated any concerns about your memory: no  Cognitive Screening: Normal - MMSE (Mini Mental Status Exam)    Patient Care Team Patient Care Team:  Momo French MD as PCP - [facility]  Momo French MD as PCP - [facility] Provider    Assessment/Plan   Education and counseling provided:  Are appropriate based on today's review and evaluation    Diagnoses and all orders for this visit:    1. Medicare annual wellness visit, subsequent  -     [place] ANNUAL ALCOHOL SCREEN 15 MIN    2. Screening for alcoholism  -     [place] ANNUAL ALCOHOL SCREEN 15 MIN    3. Screening for depression  -     DEPRESSION SCREEN ANNUAL    4. Screening for ischemic heart disease  -     LIPID PANEL    5. Essential hypertension, benign    6. Hypothyroidism, unspecified type    7. Essential hypertension    8. Acquired hypothyroidism        Health Maintenance Due   Topic Date Due    Shingrix Vaccine Age 49> (1 of 2) 07/11/1982    GLAUCOMA SCREENING Q2Y  07/11/1997    Bone Densitometry (Dexa) Screening  07/11/1997    Pneumococcal 65+ years (1 of 1 - PPSV23) 07/11/1997    Medicare Yearly Exam  03/29/2020       Junior Sharp is a 80 y.o. female being evaluated by a video visit encounter for concerns as above. A caregiver was present when appropriate. Due to this being a TeleHealth encounter (During [facility]- public health emergency), evaluation of the following organ systems was limited: Vitals/Constitutional/EENT/Resp/CV/GI//MS/Neuro/Skin/Heme-Lymph-Imm. Pursuant to the emergency declaration under the [facility], 1135 waiver authority and the [employer] General Act, this Virtual  Visit was conducted, with patient's (and/or legal guardian's) consent, to reduce the patient's risk of exposure to COVID-19 and provide necessary medical care. Services were provided through a video synchronous discussion virtually to substitute for in-person clinic visit. Patient and provider were located at their individual homes.     Isrrael Delaney

## 2020-04-13 NOTE — PATIENT INSTRUCTIONS
Medicare Wellness Visit, Female The best way to live healthy is to have a lifestyle where you eat a well-balanced diet, exercise regularly, limit alcohol use, and quit all forms of tobacco/nicotine, if applicable. Regular preventive services are another way to keep healthy. Preventive services (vaccines, screening tests, monitoring & exams) can help personalize your care plan, which helps you manage your own care. Screening tests can find health problems at the earliest stages, when they are easiest to treat. [facility] follows the current, evidence-based guidelines published by the [facility] ([facility]) when recommending preventive services for our patients. Because we follow these guidelines, sometimes recommendations change over time as research supports it. (For example, mammograms used to be recommended annually. Even though Medicare will still pay for an annual mammogram, the newer guidelines recommend a mammogram every two years for women of average risk). Of course, you and your doctor may decide to screen more often for some diseases, based on your risk and your co-morbidities (chronic disease you are already diagnosed with). Preventive services for you include: - Medicare offers their members a free annual wellness visit, which is time for you and your primary care provider to discuss and plan for your preventive service needs. Take advantage of this benefit every year! 
-All adults over the age of 72 should receive the recommended pneumonia vaccines. Current USPSTF guidelines recommend a series of two vaccines for the best pneumonia protection.  
-All adults should have a flu vaccine yearly and a tetanus vaccine every 10 years.  
-All adults age 48 and older should receive the shingles vaccines (series of two vaccines). -All adults age 38-68 who are overweight should have a diabetes screening test once every three years. -All adults born between [birth year] and 1965 should be screened once for Hepatitis C. 
-Other screening tests and preventive services for persons with diabetes include: an eye exam to screen for diabetic retinopathy, a kidney function test, a foot exam, and stricter control over your cholesterol.  
-Cardiovascular screening for adults with routine risk involves an electrocardiogram (ECG) at intervals determined by your doctor.  
-Colorectal cancer screenings should be done for adults age 54-65 with no increased risk factors for colorectal cancer. There are a number of acceptable methods of screening for this type of cancer. Each test has its own benefits and drawbacks. Discuss with your doctor what is most appropriate for you during your annual wellness visit. The different tests include: colonoscopy (considered the best screening method), a fecal occult blood test, a fecal DNA test, and sigmoidoscopy. 
 
-A bone mass density test is recommended when a woman turns 65 to screen for osteoporosis. This test is only recommended one time, as a screening. Some providers will use this same test as a disease monitoring tool if you already have osteoporosis. -Breast cancer screenings are recommended every other year for women of normal risk, age 54-69. 
-Cervical cancer screenings for women over age 72 are only recommended with certain risk factors. Here is a list of your current Health Maintenance items (your personalized list of preventive services) with a due date: 
Health Maintenance Due Topic Date Due  Shingles Vaccine (1 of 2) 07/11/1982  Glaucoma Screening   07/11/1997  Bone Mineral Density   07/11/1997  Pneumococcal Vaccine (1 of 1 - PPSV23) 07/11/1997 [address] [place] [address] Annual Well Visit  03/29/2020

## 2020-04-14 PROBLEM — Z00.00 MEDICARE ANNUAL WELLNESS VISIT, SUBSEQUENT: Status: ACTIVE | Noted: 2017-03-28

## 2020-04-14 NOTE — PROGRESS NOTES
Stuart Walker is a 80 y.o. female evaluated via telephone on 4/13/2020. Consent:  She and/or health care decision maker is aware that that she may receive a bill for this telephone service, depending on her insurance coverage, and has provided verbal consent to proceed: Yes      Documentation:  I communicated with the patient and/or health care decision maker about blood pressure management and hypothryoidism. Details of this discussion including any medical advice provided:  HTN not controlled at last visit. Pt has no way to measure blood pressure at home and has missed several appointments. Advised to check bp at home, adhere to low salt diet, do age appropriate exercise. Pt advised to have blood work at next visit. I affirm this is a Patient Initiated Episode with an Established Patient who has not had a related appointment within my department in the past 7 days or scheduled within the next 24 hours.     Total Time: minutes: 5-10 minutes    Note: not billable if this call serves to triage the patient into an appointment for the relevant concern      Anjali Ragsdale MD

## 2020-04-23 DIAGNOSIS — I10 ESSENTIAL HYPERTENSION, BENIGN: ICD-10-CM

## 2020-04-24 RX ORDER — AMLODIPINE BESYLATE 5 MG/1
5 TABLET ORAL DAILY
Qty: 30 TAB | Refills: 2 | Status: SHIPPED | OUTPATIENT
Start: 2020-04-24 | End: 2020-06-01 | Stop reason: SDUPTHER

## 2020-05-14 PROBLEM — Z00.00 MEDICARE ANNUAL WELLNESS VISIT, SUBSEQUENT: Status: RESOLVED | Noted: 2017-03-28 | Resolved: 2020-05-14

## 2020-06-01 DIAGNOSIS — E03.9 HYPOTHYROIDISM, UNSPECIFIED TYPE: ICD-10-CM

## 2020-06-01 DIAGNOSIS — I10 ESSENTIAL HYPERTENSION, BENIGN: ICD-10-CM

## 2020-06-02 DIAGNOSIS — I10 ESSENTIAL HYPERTENSION, BENIGN: ICD-10-CM

## 2020-06-02 RX ORDER — LEVOTHYROXINE SODIUM 88 UG/1
88 TABLET ORAL
Qty: 90 TAB | Refills: 0 | Status: SHIPPED | OUTPATIENT
Start: 2020-06-02

## 2020-06-02 RX ORDER — AMLODIPINE BESYLATE 5 MG/1
5 TABLET ORAL DAILY
Qty: 90 TAB | Refills: 0 | Status: SHIPPED | OUTPATIENT
Start: 2020-06-02

## 2020-06-02 RX ORDER — HYDROCHLOROTHIAZIDE 25 MG/1
25 TABLET ORAL DAILY
Qty: 90 TAB | Refills: 0 | Status: SHIPPED | OUTPATIENT
Start: 2020-06-02

## 2021-07-16 NOTE — PROGRESS NOTES
No sign of infection, except of bacterial vaginosis for which patient has already received treatment. Patient taken to bathroom, walked with steady gait with assistance.

## 2022-03-18 PROBLEM — I10 ESSENTIAL HYPERTENSION: Status: ACTIVE | Noted: 2017-02-21

## 2022-03-18 PROBLEM — G47.00 INSOMNIA: Status: ACTIVE | Noted: 2017-02-21

## 2022-03-18 PROBLEM — E03.9 ACQUIRED HYPOTHYROIDISM: Status: ACTIVE | Noted: 2017-02-21

## 2022-03-18 PROBLEM — F32.A DEPRESSION: Status: ACTIVE | Noted: 2017-02-21

## 2022-03-18 PROBLEM — Z63.8 STRESS DUE TO FAMILY TENSION: Status: ACTIVE | Noted: 2017-02-21

## 2022-03-18 PROBLEM — M19.90 ARTHRITIS: Status: ACTIVE | Noted: 2017-02-21

## 2022-03-20 PROBLEM — Z78.9 FULL CODE STATUS: Status: ACTIVE | Noted: 2017-03-28

## 2023-05-25 RX ORDER — RANITIDINE 150 MG/1
150 TABLET ORAL 2 TIMES DAILY
COMMUNITY
Start: 2019-07-05

## 2023-05-25 RX ORDER — LEVOTHYROXINE SODIUM 88 UG/1
88 TABLET ORAL
COMMUNITY
Start: 2020-06-02

## 2023-05-25 RX ORDER — ACETAMINOPHEN 500 MG
TABLET ORAL EVERY 6 HOURS PRN
COMMUNITY

## 2023-05-25 RX ORDER — TRAZODONE HYDROCHLORIDE 50 MG/1
50 TABLET ORAL
COMMUNITY
Start: 2019-03-29

## 2023-05-25 RX ORDER — TRAMADOL HYDROCHLORIDE 50 MG/1
50 TABLET ORAL EVERY 6 HOURS PRN
COMMUNITY

## 2023-05-25 RX ORDER — HYDROCHLOROTHIAZIDE 25 MG/1
25 TABLET ORAL DAILY
COMMUNITY
Start: 2020-06-02

## 2023-05-25 RX ORDER — AMLODIPINE BESYLATE 5 MG/1
5 TABLET ORAL DAILY
COMMUNITY
Start: 2020-06-02